# Patient Record
Sex: FEMALE | Race: WHITE | NOT HISPANIC OR LATINO | Employment: FULL TIME
[De-identification: names, ages, dates, MRNs, and addresses within clinical notes are randomized per-mention and may not be internally consistent; named-entity substitution may affect disease eponyms.]

---

## 2017-10-29 ENCOUNTER — HEALTH MAINTENANCE LETTER (OUTPATIENT)
Age: 32
End: 2017-10-29

## 2018-01-09 ENCOUNTER — OFFICE VISIT (OUTPATIENT)
Dept: FAMILY MEDICINE | Facility: CLINIC | Age: 33
End: 2018-01-09
Payer: COMMERCIAL

## 2018-01-09 VITALS
WEIGHT: 166 LBS | BODY MASS INDEX: 25.16 KG/M2 | TEMPERATURE: 98 F | HEIGHT: 68 IN | OXYGEN SATURATION: 95 % | DIASTOLIC BLOOD PRESSURE: 62 MMHG | RESPIRATION RATE: 16 BRPM | HEART RATE: 73 BPM | SYSTOLIC BLOOD PRESSURE: 100 MMHG

## 2018-01-09 DIAGNOSIS — Z00.00 ROUTINE GENERAL MEDICAL EXAMINATION AT A HEALTH CARE FACILITY: Primary | ICD-10-CM

## 2018-01-09 PROCEDURE — 87624 HPV HI-RISK TYP POOLED RSLT: CPT | Performed by: FAMILY MEDICINE

## 2018-01-09 PROCEDURE — 99395 PREV VISIT EST AGE 18-39: CPT | Performed by: FAMILY MEDICINE

## 2018-01-09 PROCEDURE — G0145 SCR C/V CYTO,THINLAYER,RESCR: HCPCS | Performed by: FAMILY MEDICINE

## 2018-01-09 RX ORDER — VILAZODONE HYDROCHLORIDE 40 MG/1
TABLET ORAL
Refills: 3 | COMMUNITY
Start: 2017-10-31 | End: 2018-03-02

## 2018-01-09 NOTE — NURSING NOTE
"Chief Complaint   Patient presents with     Physical       Initial /62  Pulse 73  Temp 98  F (36.7  C) (Tympanic)  Resp 16  Ht 5' 8\" (1.727 m)  Wt 166 lb (75.3 kg)  LMP 11/30/2017 (Approximate)  SpO2 95%  BMI 25.24 kg/m2 Estimated body mass index is 25.24 kg/(m^2) as calculated from the following:    Height as of this encounter: 5' 8\" (1.727 m).    Weight as of this encounter: 166 lb (75.3 kg).  Medication Reconciliation: complete   .Jose Alejandro PADRON      "

## 2018-01-09 NOTE — MR AVS SNAPSHOT
After Visit Summary   1/9/2018    Oanh Interiano    MRN: [id number]           Patient Information     Date Of Birth          1985        Visit Information        Provider Department      1/9/2018 2:20 PM Elif Beard MD [facility]        Today's Diagnoses     Routine general medical examination at a health care facility    -  1      Care Instructions      Preventive Health Recommendations  Female Ages 26 - 39  Yearly exam:   See your health care provider every year in order to    Review health changes.     Discuss preventive care.      Review your medicines if you your doctor has prescribed any.    Until age 30: Get a Pap test every three years (more often if you have had an abnormal result).    After age 30: Talk to your doctor about whether you should have a Pap test every 3 years or have a Pap test with HPV screening every 5 years.   You do not need a Pap test if your uterus was removed (hysterectomy) and you have not had cancer.  You should be tested each year for STDs (sexually transmitted diseases), if you're at risk.   Talk to your provider about how often to have your cholesterol checked.  If you are at risk for diabetes, you should have a diabetes test (fasting glucose).  Shots: Get a flu shot each year. Get a tetanus shot every 10 years.   Nutrition:     Eat at least 5 servings of fruits and vegetables each day.    Eat whole-grain bread, whole-wheat pasta and brown rice instead of white grains and rice.    Talk to your provider about Calcium and Vitamin D.     Lifestyle    Exercise at least 150 minutes a week (30 minutes a day, 5 days of the week). This will help you control your weight and prevent disease.    Limit alcohol to one drink per day.    No smoking.     Wear sunscreen to prevent skin cancer.    See your dentist every six months for an exam and cleaning.            Follow-ups after your visit        Who to contact     If you have  "questions or need follow up information about today's clinic visit or your schedule please contact [facility] directly at [phone].  Normal or non-critical lab and imaging results will be communicated to you by MyChart, letter or phone within 4 business days after the clinic has received the results. If you do not hear from us within 7 days, please contact the clinic through [employer] or phone. If you have a critical or abnormal lab result, we will notify you by phone as soon as possible.  Submit refill requests through [employer] or call your pharmacy and they will forward the refill request to us. Please allow 3 business days for your refill to be completed.          Additional Information About Your Visit        [employer] Information     [employer] gives you secure access to your electronic health record. If you see a primary care provider, you can also send messages to your care team and make appointments. If you have questions, please call your primary care clinic.  If you do not have a primary care provider, please call [phone] and they will assist you.        Care EveryWhere ID     This is your Care EveryWhere ID. This could be used by other organizations to access your [facility] medical records  [id number]        Your Vitals Were     Pulse Temperature Respirations Height Last Period Pulse Oximetry    73 98  F (36.7  C) (Tympanic) 16 5' 8\" (1.727 m) 11/30/2017 (Approximate) 95%    BMI (Body Mass Index)                   25.24 kg/m2            Blood Pressure from Last 3 Encounters:   01/09/18 100/62   07/26/16 120/60   04/13/15 106/66    Weight from Last 3 Encounters:   01/09/18 166 lb (75.3 kg)   07/26/16 164 lb (74.4 kg)   04/13/15 167 lb 1.6 oz (75.8 kg)              We Performed the Following     HPV High Risk Types DNA Cervical     Pap imaged thin layer screen with HPV - recommended age 30 - 65        Primary Care Provider Office Phone # Fax #    Elif Beard MD " [phone]       [address] [facility] [address]        Equal Access to Services     FABIEN IVEY : Hadii aad ku hadgabemarianne Amber, waronaldda luqadaha, qaybta kaalmada angellaorestes, abad darshanain hayaacarito peralestnoy yubayron nimisha mayorga. So [facility] [phone].    ATENCIÓN: Si habla español, tiene a ford disposición servicios gratuitos de asistencia lingüística. Llame al [phone].    We comply with applicable federal civil rights laws and [place] laws. We do not discriminate on the basis of race, color, national origin, age, disability, sex, sexual orientation, or gender identity.            Thank you!     Thank you for choosing [facility]  for your care. Our goal is always to provide you with excellent care. Hearing back from our patients is one way we can continue to improve our services. Please take a few minutes to complete the written survey that you may receive in the mail after your visit with us. Thank you!             Your Updated Medication List - Protect others around you: Learn how to safely use, store and throw away your medicines at www.disposemymeds.org.          This list is accurate as of: 1/9/18  9:51 PM.  Always use your most recent med list.                   Brand Name Dispense Instructions for use Diagnosis    prenatal multivitamin plus iron 27-0.8 MG Tabs per tablet     100 tablet    Take 1 tablet by mouth daily    Possible pregnancy, not confirmed       VIIBRYD 40 MG Tabs tablet   Generic drug:  vilazodone      TK 1 T PO ONCE A DAY AS DIRECTED

## 2018-01-09 NOTE — PROGRESS NOTES
SUBJECTIVE:   CC: Oanh Interiano is an 32 year old woman who presents for preventive health visit.     Healthy Habits:    Do you get at least three servings of calcium containing foods daily (dairy, green leafy vegetables, etc.)? no    Amount of exercise or daily activities, outside of work: 3 day(s) per week    Problems taking medications regularly No    Medication side effects: No    Have you had an eye exam in the past two years? no    Do you see a dentist twice per year? yes    Do you have sleep apnea, excessive snoring or daytime drowsiness?no    Today's PHQ-2 Score:   PHQ-2 ( 1999 Pf[employer]) 7/26/2016 7/16/2013   Q1: Little interest or pleasure in doing things 2 0   Q2: Feeling down, depressed or hopeless 1 0   PHQ-2 Score 3 0         Abuse: Current or Past(Physical, Sexual or Emotional)- No  Do you feel safe in your environment - Yes  Social History   Substance Use Topics     Smoking status: Never Smoker     Smokeless tobacco: Never Used     Alcohol use Yes      Comment: occ     If you drink alcohol do you typically have >3 drinks per day or >7 drinks per week? Yes - AUDIT SCORE:                            Reviewed orders with patient.  Reviewed health maintenance and updated orders accordingly - Yes    Pertinent mammograms are reviewed under the imaging tab.  History of abnormal Pap smear:   NO - age 30-65 PAP every 5 years with negative HPV co-testing recommended  Last 3 Pap and HPV Results:   PAP / HPV 7/16/2014 3/21/2013 4/18/2012   PAP NIL ASC-US(A) NIL     Reviewed and updated as needed this visit by clinical staffTobacco  Allergies  Meds  Med Hx  Surg Hx  Fam Hx  Soc Hx      Reviewed and updated as needed this visit by Provider  Meds          ROS:  C: NEGATIVE for fever, chills, change in weight  I: NEGATIVE for worrisome rashes, moles or lesions  E: NEGATIVE for vision changes or irritation  ENT: NEGATIVE for ear, mouth and throat problems  R: NEGATIVE for significant cough or SOB  B: NEGATIVE  "for masses, tenderness or discharge  CV: NEGATIVE for chest pain, palpitations or peripheral edema  GI: NEGATIVE for nausea, abdominal pain, heartburn, or change in bowel habits  : NEGATIVE for unusual urinary or vaginal symptoms. Periods are regular.  M: NEGATIVE for significant arthralgias or myalgia  N: NEGATIVE for weakness, dizziness or paresthesias  P: NEGATIVE for changes in mood or affect    OBJECTIVE:   /62  Pulse 73  Temp 98  F (36.7  C) (Tympanic)  Resp 16  Ht 5' 8\" (1.727 m)  Wt 166 lb (75.3 kg)  LMP 11/30/2017 (Approximate)  SpO2 95%  BMI 25.24 kg/m2  EXAM:  GENERAL: healthy, alert and no distress  EYES: Eyes grossly normal to inspection, PERRL and conjunctivae and sclerae normal  HENT: ear canals and TM's normal, nose and mouth without ulcers or lesions  NECK: no adenopathy, no asymmetry, masses, or scars and thyroid normal to palpation  RESP: lungs clear to auscultation - no rales, rhonchi or wheezes  BREAST: normal without masses, tenderness or nipple discharge and no palpable axillary masses or adenopathy  CV: regular rate and rhythm, normal S1 S2, no S3 or S4, no murmur, click or rub, no peripheral edema and peripheral pulses strong  ABDOMEN: soft, nontender, no hepatosplenomegaly, no masses and bowel sounds normal   (female): normal female external genitalia, normal urethral meatus, vaginal mucosa pink, moist, well rugated, and normal cervix/adnexa/uterus without masses or discharge  MS: no gross musculoskeletal defects noted, no edema  SKIN: no suspicious lesions or rashes  NEURO: Normal strength and tone, mentation intact and speech normal  PSYCH: mentation appears normal, affect normal/bright    ASSESSMENT/PLAN:   Oanh was seen today for physical.    Diagnoses and all orders for this visit:    Routine general medical examination at a health care facility  -     Pap imaged thin layer screen with HPV - recommended age 30 - 65  -     HPV High Risk Types DNA " "Cervical      Discussed PNV and importance of discussing med plan with psychiatrist before conceiving again - she'll get in touch with psych NP to discuss weaning plan for vibryyd before conception.      COUNSELING:   Reviewed preventive health counseling, as reflected in patient instructions         reports that she has never smoked. She has never used smokeless tobacco.    Estimated body mass index is 25.24 kg/(m^2) as calculated from the following:    Height as of this encounter: 5' 8\" (1.727 m).    Weight as of this encounter: 166 lb (75.3 kg).   Weight management plan: Discussed healthy diet and exercise guidelines and patient will follow up in 12 months in clinic to re-evaluate.    Counseling Resources:  ATP IV Guidelines  Pooled Cohorts Equation Calculator  Breast Cancer Risk Calculator  FRAX Risk Assessment  ICSI Preventive Guidelines  Dietary Guidelines for Americans, 2010  USDA's MyPlate  ASA Prophylaxis  Lung CA Screening    Elif Beard MD  [facility]  "

## 2018-01-12 LAB
COPATH REPORT: NORMAL
PAP: NORMAL

## 2018-01-16 LAB
FINAL DIAGNOSIS: NORMAL
HPV HR 12 DNA CVX QL NAA+PROBE: NEGATIVE
HPV16 DNA SPEC QL NAA+PROBE: NEGATIVE
HPV18 DNA SPEC QL NAA+PROBE: NEGATIVE
SPECIMEN DESCRIPTION: NORMAL
SPECIMEN SOURCE CVX/VAG CYTO: NORMAL

## 2018-01-29 ENCOUNTER — TELEPHONE (OUTPATIENT)
Dept: OBGYN | Facility: CLINIC | Age: 33
End: 2018-01-29

## 2018-01-29 NOTE — TELEPHONE ENCOUNTER
"Pt called c/o bleeding.  LMP 12/28/17.  Bleeding started this morning, \"like a period\".  Discussed rationale for early bleeding.  She will monitor and if bleeding continues, will take HPT in ~1 week.  Adriana Rasheed RN      "

## 2018-03-02 ENCOUNTER — OFFICE VISIT (OUTPATIENT)
Dept: FAMILY MEDICINE | Facility: CLINIC | Age: 33
End: 2018-03-02
Payer: COMMERCIAL

## 2018-03-02 VITALS
SYSTOLIC BLOOD PRESSURE: 91 MMHG | HEART RATE: 99 BPM | DIASTOLIC BLOOD PRESSURE: 61 MMHG | WEIGHT: 166 LBS | BODY MASS INDEX: 25.24 KG/M2 | TEMPERATURE: 98.8 F | OXYGEN SATURATION: 100 %

## 2018-03-02 DIAGNOSIS — N91.2 AMENORRHEA: Primary | ICD-10-CM

## 2018-03-02 DIAGNOSIS — Z32.01 PREGNANCY TEST POSITIVE: ICD-10-CM

## 2018-03-02 DIAGNOSIS — F32.1 MODERATE MAJOR DEPRESSION (H): ICD-10-CM

## 2018-03-02 LAB — BETA HCG QUAL IFA URINE: POSITIVE

## 2018-03-02 PROCEDURE — 84703 CHORIONIC GONADOTROPIN ASSAY: CPT | Performed by: NURSE PRACTITIONER

## 2018-03-02 PROCEDURE — 99213 OFFICE O/P EST LOW 20 MIN: CPT | Performed by: NURSE PRACTITIONER

## 2018-03-02 ASSESSMENT — ANXIETY QUESTIONNAIRES
GAD7 TOTAL SCORE: 8
2. NOT BEING ABLE TO STOP OR CONTROL WORRYING: SEVERAL DAYS
6. BECOMING EASILY ANNOYED OR IRRITABLE: MORE THAN HALF THE DAYS
7. FEELING AFRAID AS IF SOMETHING AWFUL MIGHT HAPPEN: SEVERAL DAYS
3. WORRYING TOO MUCH ABOUT DIFFERENT THINGS: MORE THAN HALF THE DAYS
1. FEELING NERVOUS, ANXIOUS, OR ON EDGE: SEVERAL DAYS
IF YOU CHECKED OFF ANY PROBLEMS ON THIS QUESTIONNAIRE, HOW DIFFICULT HAVE THESE PROBLEMS MADE IT FOR YOU TO DO YOUR WORK, TAKE CARE OF THINGS AT HOME, OR GET ALONG WITH OTHER PEOPLE: SOMEWHAT DIFFICULT
5. BEING SO RESTLESS THAT IT IS HARD TO SIT STILL: SEVERAL DAYS

## 2018-03-02 ASSESSMENT — PAIN SCALES - GENERAL: PAINLEVEL: NO PAIN (0)

## 2018-03-02 ASSESSMENT — PATIENT HEALTH QUESTIONNAIRE - PHQ9: 5. POOR APPETITE OR OVEREATING: NOT AT ALL

## 2018-03-02 NOTE — MR AVS SNAPSHOT
After Visit Summary   3/2/2018    Oanh Interiano    MRN: [id number]           Patient Information     Date Of Birth          1985        Visit Information        Provider Department      3/2/2018 2:00 PM Vannesa Tarango APRN CNP [facility]        Today's Diagnoses     Amenorrhea    -  1    Moderate major depression (H)        Pregnancy test positive           Follow-ups after your visit        Additional Services     OB/GYN REFERRAL       Your provider has referred you to:  [facility]: [facility] [phone]   [email or web address]    Please be aware that coverage of these services is subject to the terms and limitations of your health insurance plan.  Call member services at your health plan with any benefit or coverage questions.      Please bring the following with you to your appointment:    (1) Any X-Rays, CTs or MRIs which have been performed.  Contact the facility where they were done to arrange for  prior to your scheduled appointment.   (2) List of current medications   (3) This referral request   (4) Any documents/labs given to you for this referral                  Who to contact     If you have questions or need follow up information about today's clinic visit or your schedule please contact [facility] directly at [phone].  Normal or non-critical lab and imaging results will be communicated to you by MyChart, letter or phone within 4 business days after the clinic has received the results. If you do not hear from us within 7 days, please contact the clinic through MyChart or phone. If you have a critical or abnormal lab result, we will notify you by phone as soon as possible.  Submit refill requests through [employer] or call your pharmacy and they will forward the refill request to us. Please allow 3 business days for your refill to be completed.          Additional Information  About Your Visit        [employer] Information     [employer] gives you secure access to your electronic health record. If you see a primary care provider, you can also send messages to your care team and make appointments. If you have questions, please call your primary care clinic.  If you do not have a primary care provider, please call [phone] and they will assist you.        Care EveryWhere ID     This is your Care EveryWhere ID. This could be used by other organizations to access your [facility] medical records  [id number]        Your Vitals Were     Pulse Temperature Last Period Pulse Oximetry Breastfeeding? BMI (Body Mass Index)    99 98.8  F (37.1  C) (Oral) 01/29/2018 (LMP Unknown) 100% No 25.24 kg/m2       Blood Pressure from Last 3 Encounters:   03/02/18 91/61   01/09/18 100/62   07/26/16 120/60    Weight from Last 3 Encounters:   03/02/18 166 lb (75.3 kg)   01/09/18 166 lb (75.3 kg)   07/26/16 164 lb (74.4 kg)              We Performed the Following     Beta HCG qual [facility] urine - [facility] and [place]     OB/GYN REFERRAL          Today's Medication Changes          These changes are accurate as of 3/2/18  2:59 PM.  If you have any questions, ask your nurse or doctor.               Start taking these medicines.        Dose/Directions    sertraline 50 MG tablet   Commonly known as:  ZOLOFT   Used for:  Moderate major depression (H)   Started by:  Vannesa Tarango APRN CNP        Take 1/2 tablet (25 mg) for 1-2 weeks, then increase to 1 tablet orally daily   Quantity:  30 tablet   Refills:  1            Where to get your medicines      These medications were sent to [facility] - [place], [place] - [address]  [address], [facility] [address]     Phone:  [phone]     sertraline 50 MG tablet                Primary Care Provider Office Phone # Fax #    Elif Beard -[phone] [address] [facility] [address]         Equal Access to Services     [facility] HospitalGE[facility]GI : Hadii aad ku hadgabemarianne Tinalise, waronaldda luqadaha, qaybta mitulmarioabad santana. So [facility] [phone].    ATENCIÓN: Si habla español, tiene a ford disposición servicios gratuitos de asistencia lingüística. Llame al [phone].    We comply with applicable federal civil rights laws and [place] laws. We do not discriminate on the basis of race, color, national origin, age, disability, sex, sexual orientation, or gender identity.            Thank you!     Thank you for choosing [facility]  for your care. Our goal is always to provide you with excellent care. Hearing back from our patients is one way we can continue to improve our services. Please take a few minutes to complete the written survey that you may receive in the mail after your visit with us. Thank you!             Your Updated Medication List - Protect others around you: Learn how to safely use, store and throw away your medicines at www.disposemymeds.org.          This list is accurate as of 3/2/18  2:59 PM.  Always use your most recent med list.                   Brand Name Dispense Instructions for use Diagnosis    prenatal multivitamin plus iron 27-0.8 MG Tabs per tablet     100 tablet    Take 1 tablet by mouth daily    Possible pregnancy, not confirmed       sertraline 50 MG tablet    ZOLOFT    30 tablet    Take 1/2 tablet (25 mg) for 1-2 weeks, then increase to 1 tablet orally daily    Moderate major depression (H)

## 2018-03-02 NOTE — PROGRESS NOTES
SUBJECTIVE:   Oanh Interiano is a 32 year old female who presents to clinic today for the following health issues:      Patient is here today to confirm pregnancy, sis a home test last Mon.and Tues and both were pos.    LMP 1/29/18  Upset stomach and diarrhea past 2 days  Symptoms better today  No fevers  No loose stools today  No spotting  Mild nausea and breast tenderness  Denies alcohol, smoking, drug use  She is taking a prenatal    She has a 2 year old daughter  She miscarried at 5w last month  [facility] and was not happy with the experience there so she is looking to deliver somewhere else    She has a history of MDD  She was seeing a therapist  Now seeing a couples therapist with her   She was seeing a psychiatrist.Couldn't get back in to see her for 1 1/2 months  She was on Vibryd 20 mg  She stopped taking 4 days ago d/t concern for side effects in pregnancy and its a newer medication without research  Wants to restart sertraline  Has noticed some increased sensitivity, overly emotional  She experienced sexual side effects, fatigue with sertraline but otherwise tolerated and found it effective for treating depression  Has also tried Paxil, Prozac, Buspar, Celexa which did not work well for her  Denies suicidal ideation    Problem list and histories reviewed & adjusted, as indicated.  Additional history: none    Patient Active Problem List   Diagnosis     Generalized anxiety disorder     CARDIOVASCULAR SCREENING; LDL GOAL LESS THAN 160     Fear of public speaking     Moderate major depression (H)     Postpartum anxiety     Past Surgical History:   Procedure Laterality Date     NO HISTORY OF SURGERY         Social History   Substance Use Topics     Smoking status: Never Smoker     Smokeless tobacco: Never Used     Alcohol use Yes      Comment: occ     Family History   Problem Relation Age of Onset     Thyroid Disease Mother      hypothyroidism     Circulatory Mother       Depression Mother      Neurologic Disorder Mother      Psychotic Disorder Mother      Thyroid Disease Sister      goiter     Hypertension Father      Lipids Father      DIABETES Paternal Grandmother      CEREBROVASCULAR DISEASE Paternal Grandmother      Alcohol/Drug Paternal Grandmother      CEREBROVASCULAR DISEASE Paternal Grandfather      Alcohol/Drug Paternal Grandfather      Circulatory Paternal Grandfather      Eye Disorder Paternal Grandfather      Cancer - colorectal Maternal Grandfather      Allergies Sister      Alzheimer Disease Maternal Grandmother      Depression Sister      Genitourinary Problems Sister      Psychotic Disorder Sister      Asthma Sister            Reviewed and updated as needed this visit by clinical staff  Tobacco  Allergies  Meds  Med Hx  Surg Hx  Fam Hx  Soc Hx      Reviewed and updated as needed this visit by Provider         ROS:  Constitutional, HEENT, cardiovascular, pulmonary, gi and gu systems are negative, except as otherwise noted.    OBJECTIVE:     BP 91/61 (BP Location: Left arm, Patient Position: Chair, Cuff Size: Adult Regular)  Pulse 99  Temp 98.8  F (37.1  C) (Oral)  Wt 166 lb (75.3 kg)  LMP 01/29/2018 (LMP Unknown)  SpO2 100%  Breastfeeding? No  BMI 25.24 kg/m2  Body mass index is 25.24 kg/(m^2).  GENERAL: healthy, alert and no distress  RESP: lungs clear to auscultation - no rales, rhonchi or wheezes  CV: regular rate and rhythm, normal S1 S2, no S3 or S4, no murmur, click or rub, no peripheral edema and peripheral pulses strong  ABDOMEN: soft, nontender, no hepatosplenomegaly, no masses and bowel sounds normal    Diagnostic Test Results:  Results for orders placed or performed in visit on 03/02/18   Beta HCG qual [facility] urine - [facility] and [place]   Result Value Ref Range    Beta HCG Qual IFA Urine Positive (A) NEG^Negative          ASSESSMENT/PLAN:       ICD-10-CM    1. Amenorrhea N91.2 Beta HCG qual [facility] urine - [facility] and [place]   2. Moderate major  depression (H) F32.1 sertraline (ZOLOFT) 50 MG tablet   3. Pregnancy test positive Z32.01 OB/GYN REFERRAL     Recommended looking into delivering at [facility] and discussed [facility] who also see patients at our clinic  Discussed risks and benefits of SSRIs in pregnancy, also discussed risks and untreated depression in pregnancy. Recommend restarting SSRI. She tolerated Zoloft and I think its reasonable to restart. Will start at low dose. Advised may take 4-8 weeks to achieve therapeutic effect, though I expect less time for her as she was previously on Vibryd. Advised to follow up in 1 month    AGUSTINA [facility]

## 2018-03-03 ASSESSMENT — PATIENT HEALTH QUESTIONNAIRE - PHQ9: SUM OF ALL RESPONSES TO PHQ QUESTIONS 1-9: 8

## 2018-03-03 ASSESSMENT — ANXIETY QUESTIONNAIRES: GAD7 TOTAL SCORE: 8

## 2019-01-22 ENCOUNTER — OFFICE VISIT (OUTPATIENT)
Dept: DERMATOLOGY | Facility: CLINIC | Age: 34
End: 2019-01-22
Payer: COMMERCIAL

## 2019-01-22 VITALS — OXYGEN SATURATION: 96 % | HEART RATE: 83 BPM | SYSTOLIC BLOOD PRESSURE: 103 MMHG | DIASTOLIC BLOOD PRESSURE: 66 MMHG

## 2019-01-22 DIAGNOSIS — L82.1 SEBORRHEIC KERATOSIS: ICD-10-CM

## 2019-01-22 DIAGNOSIS — D22.9 NEVUS: ICD-10-CM

## 2019-01-22 DIAGNOSIS — L81.4 LENTIGO: ICD-10-CM

## 2019-01-22 DIAGNOSIS — D18.00 ANGIOMA: Primary | ICD-10-CM

## 2019-01-22 PROCEDURE — 99214 OFFICE O/P EST MOD 30 MIN: CPT | Performed by: PHYSICIAN ASSISTANT

## 2019-01-22 NOTE — PROGRESS NOTES
HPI:   Oanh Interiano is a 33 year old female who presents for Full skin cancer screening.  chief complaint  Last Skin Exam: couple years ago       1st Baseline: YES  Personal HX of Skin Cancer: none   Personal HX of Malignant Melanoma: none   Family HX of Skin Cancer / Malignant Melanoma: mother, NMSC  Personal HX of Atypical Moles: none  Risk factors: sun exposure,  in her youth, tanning bed use in her youth  New / Changing lesions: yes, spot on back  Social History: here with her 2 month old daughter, has a toddler daughter at home. She lives in [facility]  On review of systems, there are no further skin complaints, patient is feeling otherwise well.  See patient intake sheet.  ROS of the following were done and are negative: Constitutional, Eyes, Ears, Nose,   Mouth, Throat, Cardiovascular, Respiratory, GI, Genitourinary, Musculoskeletal,   Psychiatric, Endocrine, Allergic/Immunologic.    This document serves as a record of the services and decisions personally performed and made by Bria Deluna, MS, PA-C. It was created on her behalf by Lilia Anguiano, a trained medical scribe. The creation of this document is based on the provider's statements to the medical scribe.  Lilia Anguiano 12:26 PM January 22, 2019    PHYSICAL EXAM:   /66   Pulse 83   SpO2 96%   Breastfeeding? Yes   Skin exam performed as follows: Type 2 skin. Mood appropriate  Alert and Oriented X 3. Well developed, well nourished in no distress.  General appearance: Normal  Head including face: Normal  Eyes: conjunctiva and lids: Normal  Mouth: Lips, teeth, gums: Normal  Neck: Normal  Chest-breast/axillae: Normal  Back: Normal  Spleen and liver: Normal  Cardiovascular: Exam of peripheral vascular system by observation for swelling, varicosities, edema: Normal  Genitalia: groin, buttocks: Normal  Extremities: digits/nails (clubbing): Normal  Eccrine and Apocrine glands: Normal  Right upper  extremity: Normal  Left upper extremity: Normal  Right lower extremity: Normal  Left lower extremity: Normal  Skin: Scalp and body hair: See below    Pt deferred exam of breasts, groin, buttocks: No    Other physical findings:  1. Multiple pigmented macules on extremities and trunk  2. Multiple pigmented macules on face, trunk and extremities  3. Multiple vascular papules on trunk, arms and legs  4. Multiple scattered keratotic plaques       Except as noted above, no other signs of skin cancer or melanoma.     ASSESSMENT/PLAN:   Benign Full skin cancer screening today.      Patient with history of none  Advised on monthly self exams and 1 year  Patient Education: Appropriate brochures given.    Multiple benign appearing nevi on arms, legs and trunk. Discussed ABCDEs of melanoma and sunscreen.   Multiple lentigos on arms, legs and trunk. Advised benign, no treatment needed.  Multiple scattered angiomas. Advised benign, no treatment needed.   Seborrheic keratosis on arms, legs and trunk. Advised benign, no treatment needed.  Several dermal nevi - discussed removal in the future if bothersome  Dermatofibroma on the right leg x 2. Advised benign no treatment needed.         Follow-up: yearly FSE/PRN sooner    1.) Patient was asked about new and changing moles. YES  2.) Patient received a complete physical skin examination: YES  3.) Patient was counseled to perform a monthly self skin examination: YES  Scribed By: Lilia Anguiano, Medical Scribe    The information in this document, created by the medical scribe for me, accurately reflects the services I personally performed and the decisions made by me. I have reviewed and approved this document for accuracy prior to leaving the patient care area.  January 22, 2019 12:34 PM    Bria Deluna MS, PA-C

## 2019-01-22 NOTE — LETTER
1/22/2019         RE: Oanh Interiano  [address] [place] [address]        Dear Colleague,    Thank you for referring your patient, Oanh Interiano, to the [facility]. Please see a copy of my visit note below.    HPI:   Oanh Interiano is a 33 year old female who presents for Full skin cancer screening.  chief complaint  Last Skin Exam: couple years ago       1st Baseline: YES  Personal HX of Skin Cancer: none   Personal HX of Malignant Melanoma: none   Family HX of Skin Cancer / Malignant Melanoma: mother, NMSC  Personal HX of Atypical Moles: none  Risk factors: sun exposure,  in her youth, tanning bed use in her youth  New / Changing lesions: yes, spot on back  Social History: here with her 2 month old daughter, has a toddler daughter at home. She lives in [facility]  On review of systems, there are no further skin complaints, patient is feeling otherwise well.  See patient intake sheet.  ROS of the following were done and are negative: Constitutional, Eyes, Ears, Nose,   Mouth, Throat, Cardiovascular, Respiratory, GI, Genitourinary, Musculoskeletal,   Psychiatric, Endocrine, Allergic/Immunologic.    This document serves as a record of the services and decisions personally performed and made by Bria Deluna, MS, PA-C. It was created on her behalf by Lilia Anguiano, a trained medical scribe. The creation of this document is based on the provider's statements to the medical scribe.  Lilia Anguiano 12:26 PM January 22, 2019    PHYSICAL EXAM:   /66   Pulse 83   SpO2 96%   Breastfeeding? Yes   Skin exam performed as follows: Type 2 skin. Mood appropriate  Alert and Oriented X 3. Well developed, well nourished in no distress.  General appearance: Normal  Head including face: Normal  Eyes: conjunctiva and lids: Normal  Mouth: Lips, teeth, gums: Normal  Neck: Normal  Chest-breast/axillae: Normal  Back: Normal  Spleen and liver:  Normal  Cardiovascular: Exam of peripheral vascular system by observation for swelling, varicosities, edema: Normal  Genitalia: groin, buttocks: Normal  Extremities: digits/nails (clubbing): Normal  Eccrine and Apocrine glands: Normal  Right upper extremity: Normal  Left upper extremity: Normal  Right lower extremity: Normal  Left lower extremity: Normal  Skin: Scalp and body hair: See below    Pt deferred exam of breasts, groin, buttocks: No    Other physical findings:  1. Multiple pigmented macules on extremities and trunk  2. Multiple pigmented macules on face, trunk and extremities  3. Multiple vascular papules on trunk, arms and legs  4. Multiple scattered keratotic plaques       Except as noted above, no other signs of skin cancer or melanoma.     ASSESSMENT/PLAN:   Benign Full skin cancer screening today.      Patient with history of none  Advised on monthly self exams and 1 year  Patient Education: Appropriate brochures given.    Multiple benign appearing nevi on arms, legs and trunk. Discussed ABCDEs of melanoma and sunscreen.   Multiple lentigos on arms, legs and trunk. Advised benign, no treatment needed.  Multiple scattered angiomas. Advised benign, no treatment needed.   Seborrheic keratosis on arms, legs and trunk. Advised benign, no treatment needed.  Several dermal nevi - discussed removal in the future if bothersome  Dermatofibroma on the right leg x 2. Advised benign no treatment needed.         Follow-up: yearly FSE/PRN sooner    1.) Patient was asked about new and changing moles. YES  2.) Patient received a complete physical skin examination: YES  3.) Patient was counseled to perform a monthly self skin examination: YES  Scribed By: Lilia Anguiano, Medical Scribe    The information in this document, created by the medical scribe for me, accurately reflects the services I personally performed and the decisions made by me. I have reviewed and approved this document for accuracy prior to  leaving the patient care area.  January 22, 2019 12:34 PM    Bria Deluna, MS, PAVIDAL      Again, thank you for allowing me to participate in the care of your patient.        Sincerely,        Bria Deluna PA-C

## 2019-12-03 ENCOUNTER — OFFICE VISIT (OUTPATIENT)
Dept: PODIATRY | Facility: CLINIC | Age: 34
End: 2019-12-03
Payer: COMMERCIAL

## 2019-12-03 ENCOUNTER — ANCILLARY PROCEDURE (OUTPATIENT)
Dept: GENERAL RADIOLOGY | Facility: CLINIC | Age: 34
End: 2019-12-03
Attending: PODIATRIST
Payer: COMMERCIAL

## 2019-12-03 VITALS
SYSTOLIC BLOOD PRESSURE: 108 MMHG | WEIGHT: 178 LBS | BODY MASS INDEX: 27.06 KG/M2 | DIASTOLIC BLOOD PRESSURE: 61 MMHG | HEART RATE: 70 BPM

## 2019-12-03 DIAGNOSIS — M77.8 CAPSULITIS OF FOOT, LEFT: Primary | ICD-10-CM

## 2019-12-03 DIAGNOSIS — M77.8 CAPSULITIS OF FOOT, LEFT: ICD-10-CM

## 2019-12-03 PROCEDURE — 73630 X-RAY EXAM OF FOOT: CPT | Mod: LT

## 2019-12-03 PROCEDURE — 99203 OFFICE O/P NEW LOW 30 MIN: CPT | Performed by: PODIATRIST

## 2019-12-03 NOTE — LETTER
12/3/2019         RE: Oanh Interiano  [address] [place] [address]        Dear Colleague,    Thank you for referring your patient, Oanh Interiano, to the [facility]. Please see a copy of my visit note below.    S:  Patient complains of left foot pain.  Points to plantar second metatarsal head.  Describes as a burning pain.  aggravated by activity and relieved by rest.  Has had this off and on most of her life but for last several months been worse.  Ran half marathon this fall.  No pain anywhere else on feet.  Goes barefoot around house.  Works at sitting job.  Denies erythema, edema, weakness, numbness.  Denies arthralgias anywhere else.      ROS:  A 10-point review of systems was performed and is positive for that noted in the HPI and as seen above.  All other areas are negative.        Allergies   Allergen Reactions     Milk Protein Extract Nausea and Vomiting, Hives, Itching, Swelling and GI Disturbance     Itching, hives, swelling, GI distrubance     Onion GI Disturbance     Sulfa Drugs Hives       Current Outpatient Medications   Medication Sig Dispense Refill     sertraline (ZOLOFT) 50 MG tablet Take 1/2 tablet (25 mg) for 1-2 weeks, then increase to 1 tablet orally daily 30 tablet 1       Patient Active Problem List   Diagnosis     Generalized anxiety disorder     CARDIOVASCULAR SCREENING; LDL GOAL LESS THAN 160     Moderate major depression (H)     Vacuum extraction, delivered, current hospitalization     Prolonged second stage of labor, delivered     Prolonged labor with baby delivered     Postpartum atony of uterus with hemorrhage, delivered       Past Medical History:   Diagnosis Date     ASCUS with positive high risk HPV 3/2013    + HPV 59, 62     Papanicolaou smear of cervix with low grade squamous intraepithelial lesion (LGSIL) 9/2010    colp WNL       Past Surgical History:   Procedure Laterality Date     NO HISTORY OF SURGERY         Family History    Problem Relation Age of Onset     Thyroid Disease Mother         hypothyroidism     Circulatory Mother      Depression Mother      Neurologic Disorder Mother      Psychotic Disorder Mother      Skin Cancer Mother      Thyroid Disease Sister         goiter     Hypertension Father      Lipids Father      Diabetes Paternal Grandmother      Cerebrovascular Disease Paternal Grandmother      Alcohol/Drug Paternal Grandmother      Cerebrovascular Disease Paternal Grandfather      Alcohol/Drug Paternal Grandfather      Circulatory Paternal Grandfather      Eye Disorder Paternal Grandfather      Cancer - colorectal Maternal Grandfather      Allergies Sister      Alzheimer Disease Maternal Grandmother      Depression Sister      Genitourinary Problems Sister      Psychotic Disorder Sister      Asthma Sister        Social History     Tobacco Use     Smoking status: Never Smoker     Smokeless tobacco: Never Used   Substance Use Topics     Alcohol use: Yes     Comment: occ         O:   /61 (BP Location: Right arm)   Pulse 70   Wt 80.7 kg (178 lb)   BMI 27.06 kg/m   .      Constitutional/ general:  Pt is in no apparent distress, appears well-nourished.  Cooperative with history and physical exam.     Psych:  The patient answered questions appropriately.  Normal affect.  Seems to have reasonable expectations, in terms of treatment.     Eyes:  Visual scanning/ tracking without deficit.     Ears:  Response to auditory stimuli is normal.  No  hearing aid devices.  Auricles in proper alignment.     Lymphatic:  Popliteal lymph nodes not enlarged.     Lungs:  Non labored breathing, non labored speech. No cough.  No audible wheezing. Even, quiet breathing.       Vascular:  Pedal pulses are palpable bilaterally for both the DP and PT arteries.  CFT < 3 sec.  No edema.  Pedal hair growth noted.     Neuro:  Alert and oriented x 3. Coordinated gait.  Light touch sensation is intact to the L4, L5, S1 distributions. No obvious  deficits.  No evidence of neurological-based weakness, spasticity, or contracture in the lower extremities.     Derm: Normal texture and turgor.  No erythema, ecchymosis, or cyanosis.  No open lesions.     Musculoskeletal:    Lower extremity muscle strength is normal.  Patient is ambulatory without an assistive device or brace.  Pronated arch with weightbearing left>R.  No forefoot or rear foot deformities noted.  MS 5/5 all compartments.  Normal ROM all fore foot and rearfoot joints.  No equinus.  Pain under left second metatarsal head plantar.  Negative Lachmans test.  Negative Mulders click.  No pain anywhere else.  No erythema, edema, ecchymosis, or subcutaneous masses noted.      Radiographic Exam:   Long second metatarsal, but no other bone abnormalities.     A:  Subsecond capsulitis left foot     P:  X rays taken of left foot today.  Discussed cause with patient.  Ice bid.  Instructed to wear stiff soles shoes at all times and I made suggestions.  Dispensed metatarsal pad.  Avoid activities that bother this and discussed which activities will aggravate.  Will try arch supports.  Will call if she would like orthotics.  RETURN TO CLINIC PRN.    Abhijeet Read DPM DPM, FACFAS      Again, thank you for allowing me to participate in the care of your patient.        Sincerely,        Abhijeet Read DPM

## 2019-12-03 NOTE — PROGRESS NOTES
S:  Patient complains of left foot pain.  Points to plantar second metatarsal head.  Describes as a burning pain.  aggravated by activity and relieved by rest.  Has had this off and on most of her life but for last several months been worse.  Ran half marathon this fall.  No pain anywhere else on feet.  Goes barefoot around house.  Works at sitting job.  Denies erythema, edema, weakness, numbness.  Denies arthralgias anywhere else.      ROS:  A 10-point review of systems was performed and is positive for that noted in the HPI and as seen above.  All other areas are negative.        Allergies   Allergen Reactions     Milk Protein Extract Nausea and Vomiting, Hives, Itching, Swelling and GI Disturbance     Itching, hives, swelling, GI distrubance     Onion GI Disturbance     Sulfa Drugs Hives       Current Outpatient Medications   Medication Sig Dispense Refill     sertraline (ZOLOFT) 50 MG tablet Take 1/2 tablet (25 mg) for 1-2 weeks, then increase to 1 tablet orally daily 30 tablet 1       Patient Active Problem List   Diagnosis     Generalized anxiety disorder     CARDIOVASCULAR SCREENING; LDL GOAL LESS THAN 160     Moderate major depression (H)     Vacuum extraction, delivered, current hospitalization     Prolonged second stage of labor, delivered     Prolonged labor with baby delivered     Postpartum atony of uterus with hemorrhage, delivered       Past Medical History:   Diagnosis Date     ASCUS with positive high risk HPV 3/2013    + HPV 59, 62     Papanicolaou smear of cervix with low grade squamous intraepithelial lesion (LGSIL) 9/2010    colp WNL       Past Surgical History:   Procedure Laterality Date     NO HISTORY OF SURGERY         Family History   Problem Relation Age of Onset     Thyroid Disease Mother         hypothyroidism     Circulatory Mother      Depression Mother      Neurologic Disorder Mother      Psychotic Disorder Mother      Skin Cancer Mother      Thyroid Disease Sister         goiter      Hypertension Father      Lipids Father      Diabetes Paternal Grandmother      Cerebrovascular Disease Paternal Grandmother      Alcohol/Drug Paternal Grandmother      Cerebrovascular Disease Paternal Grandfather      Alcohol/Drug Paternal Grandfather      Circulatory Paternal Grandfather      Eye Disorder Paternal Grandfather      Cancer - colorectal Maternal Grandfather      Allergies Sister      Alzheimer Disease Maternal Grandmother      Depression Sister      Genitourinary Problems Sister      Psychotic Disorder Sister      Asthma Sister        Social History     Tobacco Use     Smoking status: Never Smoker     Smokeless tobacco: Never Used   Substance Use Topics     Alcohol use: Yes     Comment: occ         O:   /61 (BP Location: Right arm)   Pulse 70   Wt 80.7 kg (178 lb)   BMI 27.06 kg/m  .      Constitutional/ general:  Pt is in no apparent distress, appears well-nourished.  Cooperative with history and physical exam.     Psych:  The patient answered questions appropriately.  Normal affect.  Seems to have reasonable expectations, in terms of treatment.     Eyes:  Visual scanning/ tracking without deficit.     Ears:  Response to auditory stimuli is normal.  No  hearing aid devices.  Auricles in proper alignment.     Lymphatic:  Popliteal lymph nodes not enlarged.     Lungs:  Non labored breathing, non labored speech. No cough.  No audible wheezing. Even, quiet breathing.       Vascular:  Pedal pulses are palpable bilaterally for both the DP and PT arteries.  CFT < 3 sec.  No edema.  Pedal hair growth noted.     Neuro:  Alert and oriented x 3. Coordinated gait.  Light touch sensation is intact to the L4, L5, S1 distributions. No obvious deficits.  No evidence of neurological-based weakness, spasticity, or contracture in the lower extremities.     Derm: Normal texture and turgor.  No erythema, ecchymosis, or cyanosis.  No open lesions.     Musculoskeletal:    Lower extremity muscle strength is normal.   Patient is ambulatory without an assistive device or brace.  Pronated arch with weightbearing left>R.  No forefoot or rear foot deformities noted.  MS 5/5 all compartments.  Normal ROM all fore foot and rearfoot joints.  No equinus.  Pain under left second metatarsal head plantar.  Negative Lachmans test.  Negative Mulders click.  No pain anywhere else.  No erythema, edema, ecchymosis, or subcutaneous masses noted.      Radiographic Exam:   Long second metatarsal, but no other bone abnormalities.     A:  Subsecond capsulitis left foot     P:  X rays taken of left foot today.  Discussed cause with patient.  Ice bid.  Instructed to wear stiff soles shoes at all times and I made suggestions.  Dispensed metatarsal pad.  Avoid activities that bother this and discussed which activities will aggravate.  Will try arch supports.  Will call if she would like orthotics.  RETURN TO CLINIC PRN.    Abhijeet Read DPM DPM, FACFAS

## 2019-12-03 NOTE — PATIENT INSTRUCTIONS
Weight management plan: Patient was referred to their PCP to discuss a diet and exercise plan.  We wish you continued good healing. If you have any questions or concerns, please do not hesitate to contact us at [phone]    Please remember to call and schedule a follow up appointment if one was recommended at your earliest convenience.   PODIATRY CLINIC HOURS  TELEPHONE NUMBER    Dr. Abhijeet Read D.P.M [facility]    Clinics:  [facility]    [facility]ain[facility]   Tuesday 1PM-6PM  [place]  Wednesday 7AM-2PM  [facility]  Thursday 10AM-6PM  [place]  Friday 7AM-3PM  [place]  Specialty schedulers:   [phone] to make an appointment with any Specialty Provider.        Urgent Care locations:    [facility] Monday-Friday 5 pm - 9 pm. Saturday-Sunday 9 am -5pm    Monday-Friday 11 am - 9 pm Saturday 9 am - 5 pm     Monday-Sunday 12 noon-8PM [phone]     If you need a medication refill, please contact us you may need lab work and/or a follow up visit prior to your refill (i.e. Antifungal medications).    [facility] (secure e-mail communication and access to your chart) to send a message or to make an appointment.    If MRI needed please call [facility] at [phone]

## 2020-03-01 ENCOUNTER — HEALTH MAINTENANCE LETTER (OUTPATIENT)
Age: 35
End: 2020-03-01

## 2020-03-05 ENCOUNTER — TRANSCRIBE ORDERS (OUTPATIENT)
Dept: MATERNAL FETAL MEDICINE | Facility: CLINIC | Age: 35
End: 2020-03-05

## 2020-03-05 ENCOUNTER — PRENATAL OFFICE VISIT (OUTPATIENT)
Dept: NURSING | Facility: CLINIC | Age: 35
End: 2020-03-05
Payer: COMMERCIAL

## 2020-03-05 VITALS
BODY MASS INDEX: 26.22 KG/M2 | HEIGHT: 69 IN | WEIGHT: 177 LBS | TEMPERATURE: 98 F | SYSTOLIC BLOOD PRESSURE: 102 MMHG | DIASTOLIC BLOOD PRESSURE: 68 MMHG | HEART RATE: 82 BPM

## 2020-03-05 DIAGNOSIS — Z23 NEED FOR TDAP VACCINATION: ICD-10-CM

## 2020-03-05 DIAGNOSIS — O26.90 PREGNANCY RELATED CONDITION, ANTEPARTUM: Primary | ICD-10-CM

## 2020-03-05 DIAGNOSIS — O09.529 AMA (ADVANCED MATERNAL AGE) MULTIGRAVIDA 35+: Primary | ICD-10-CM

## 2020-03-05 LAB
ABO + RH BLD: NORMAL
ABO + RH BLD: NORMAL
ALBUMIN UR-MCNC: NEGATIVE MG/DL
APPEARANCE UR: CLEAR
BILIRUB UR QL STRIP: NEGATIVE
BLD GP AB SCN SERPL QL: NORMAL
BLOOD BANK CMNT PATIENT-IMP: NORMAL
COLOR UR AUTO: YELLOW
ERYTHROCYTE [DISTWIDTH] IN BLOOD BY AUTOMATED COUNT: 13.4 % (ref 10–15)
GLUCOSE UR STRIP-MCNC: NEGATIVE MG/DL
HBV SURFACE AG SERPL QL IA: NONREACTIVE
HCT VFR BLD AUTO: 40.5 % (ref 35–47)
HGB BLD-MCNC: 13.4 G/DL (ref 11.7–15.7)
HGB UR QL STRIP: NEGATIVE
HIV 1+2 AB+HIV1 P24 AG SERPL QL IA: NONREACTIVE
KETONES UR STRIP-MCNC: NEGATIVE MG/DL
LEUKOCYTE ESTERASE UR QL STRIP: NEGATIVE
MCH RBC QN AUTO: 31.4 PG (ref 26.5–33)
MCHC RBC AUTO-ENTMCNC: 33.1 G/DL (ref 31.5–36.5)
MCV RBC AUTO: 95 FL (ref 78–100)
NITRATE UR QL: NEGATIVE
PH UR STRIP: 7.5 PH (ref 5–7)
PLATELET # BLD AUTO: 282 10E9/L (ref 150–450)
RBC # BLD AUTO: 4.27 10E12/L (ref 3.8–5.2)
SOURCE: ABNORMAL
SP GR UR STRIP: 1.01 (ref 1–1.03)
SPECIMEN EXP DATE BLD: NORMAL
TSH SERPL DL<=0.005 MIU/L-ACNC: 1.76 MU/L (ref 0.4–4)
UROBILINOGEN UR STRIP-ACNC: 0.2 EU/DL (ref 0.2–1)
WBC # BLD AUTO: 5.6 10E9/L (ref 4–11)

## 2020-03-05 PROCEDURE — 87086 URINE CULTURE/COLONY COUNT: CPT | Performed by: OBSTETRICS & GYNECOLOGY

## 2020-03-05 PROCEDURE — 86900 BLOOD TYPING SEROLOGIC ABO: CPT | Performed by: OBSTETRICS & GYNECOLOGY

## 2020-03-05 PROCEDURE — 36415 COLL VENOUS BLD VENIPUNCTURE: CPT | Performed by: OBSTETRICS & GYNECOLOGY

## 2020-03-05 PROCEDURE — 81003 URINALYSIS AUTO W/O SCOPE: CPT | Performed by: OBSTETRICS & GYNECOLOGY

## 2020-03-05 PROCEDURE — 85027 COMPLETE CBC AUTOMATED: CPT | Performed by: OBSTETRICS & GYNECOLOGY

## 2020-03-05 PROCEDURE — 84443 ASSAY THYROID STIM HORMONE: CPT | Performed by: OBSTETRICS & GYNECOLOGY

## 2020-03-05 PROCEDURE — 87389 HIV-1 AG W/HIV-1&-2 AB AG IA: CPT | Performed by: OBSTETRICS & GYNECOLOGY

## 2020-03-05 PROCEDURE — 86850 RBC ANTIBODY SCREEN: CPT | Performed by: OBSTETRICS & GYNECOLOGY

## 2020-03-05 PROCEDURE — 99207 ZZC NO CHARGE NURSE ONLY: CPT

## 2020-03-05 PROCEDURE — 86762 RUBELLA ANTIBODY: CPT | Performed by: OBSTETRICS & GYNECOLOGY

## 2020-03-05 PROCEDURE — 87340 HEPATITIS B SURFACE AG IA: CPT | Performed by: OBSTETRICS & GYNECOLOGY

## 2020-03-05 PROCEDURE — 86901 BLOOD TYPING SEROLOGIC RH(D): CPT | Performed by: OBSTETRICS & GYNECOLOGY

## 2020-03-05 PROCEDURE — 86780 TREPONEMA PALLIDUM: CPT | Performed by: OBSTETRICS & GYNECOLOGY

## 2020-03-05 SDOH — SOCIAL STABILITY: SOCIAL INSECURITY: WITHIN THE LAST YEAR, HAVE YOU BEEN AFRAID OF YOUR PARTNER OR EX-PARTNER?: NO

## 2020-03-05 SDOH — SOCIAL STABILITY: SOCIAL INSECURITY: WITHIN THE LAST YEAR, HAVE YOU BEEN HUMILIATED OR EMOTIONALLY ABUSED IN OTHER WAYS BY YOUR PARTNER OR EX-PARTNER?: NO

## 2020-03-05 SDOH — SOCIAL STABILITY: SOCIAL INSECURITY
WITHIN THE LAST YEAR, HAVE YOU BEEN KICKED, HIT, SLAPPED, OR OTHERWISE PHYSICALLY HURT BY YOUR PARTNER OR EX-PARTNER?: NO

## 2020-03-05 SDOH — SOCIAL STABILITY: SOCIAL INSECURITY
WITHIN THE LAST YEAR, HAVE TO BEEN RAPED OR FORCED TO HAVE ANY KIND OF SEXUAL ACTIVITY BY YOUR PARTNER OR EX-PARTNER?: NO

## 2020-03-05 SDOH — SOCIAL STABILITY: SOCIAL NETWORK: ARE YOU MARRIED, WIDOWED, DIVORCED, SEPARATED, NEVER MARRIED, OR LIVING WITH A PARTNER?: MARRIED

## 2020-03-05 ASSESSMENT — MIFFLIN-ST. JEOR: SCORE: 1562.25

## 2020-03-06 LAB
BACTERIA SPEC CULT: NORMAL
RUBV IGG SERPL IA-ACNC: 36 IU/ML
SPECIMEN SOURCE: NORMAL
T PALLIDUM AB SER QL: NONREACTIVE

## 2020-03-08 NOTE — RESULT ENCOUNTER NOTE
Hood Hugo,  Congratulations!  Your prenatal labs are normal.  I look forward to seeing you soon.  Dr. Bentio

## 2020-03-27 ENCOUNTER — PRENATAL OFFICE VISIT (OUTPATIENT)
Dept: OBGYN | Facility: CLINIC | Age: 35
End: 2020-03-27
Payer: COMMERCIAL

## 2020-03-27 ENCOUNTER — ANCILLARY PROCEDURE (OUTPATIENT)
Dept: ULTRASOUND IMAGING | Facility: CLINIC | Age: 35
End: 2020-03-27
Attending: OBSTETRICS & GYNECOLOGY
Payer: COMMERCIAL

## 2020-03-27 VITALS
OXYGEN SATURATION: 99 % | HEIGHT: 69 IN | SYSTOLIC BLOOD PRESSURE: 97 MMHG | DIASTOLIC BLOOD PRESSURE: 58 MMHG | BODY MASS INDEX: 26.35 KG/M2 | TEMPERATURE: 98 F | WEIGHT: 177.9 LBS | HEART RATE: 89 BPM

## 2020-03-27 DIAGNOSIS — Z34.80 SUPERVISION OF OTHER NORMAL PREGNANCY, ANTEPARTUM: ICD-10-CM

## 2020-03-27 DIAGNOSIS — O02.1 MISSED ABORTION: Primary | ICD-10-CM

## 2020-03-27 PROCEDURE — 99201 ZZC OFFICE/OUTPT VISIT, NEW, LEVEL I: CPT | Performed by: OBSTETRICS & GYNECOLOGY

## 2020-03-27 PROCEDURE — 76830 TRANSVAGINAL US NON-OB: CPT | Performed by: OBSTETRICS & GYNECOLOGY

## 2020-03-27 ASSESSMENT — PATIENT HEALTH QUESTIONNAIRE - PHQ9
5. POOR APPETITE OR OVEREATING: NOT AT ALL
SUM OF ALL RESPONSES TO PHQ QUESTIONS 1-9: 4

## 2020-03-27 ASSESSMENT — ANXIETY QUESTIONNAIRES
7. FEELING AFRAID AS IF SOMETHING AWFUL MIGHT HAPPEN: NOT AT ALL
3. WORRYING TOO MUCH ABOUT DIFFERENT THINGS: NOT AT ALL
5. BEING SO RESTLESS THAT IT IS HARD TO SIT STILL: NOT AT ALL
GAD7 TOTAL SCORE: 1
2. NOT BEING ABLE TO STOP OR CONTROL WORRYING: NOT AT ALL
1. FEELING NERVOUS, ANXIOUS, OR ON EDGE: NOT AT ALL
6. BECOMING EASILY ANNOYED OR IRRITABLE: SEVERAL DAYS

## 2020-03-27 ASSESSMENT — MIFFLIN-ST. JEOR: SCORE: 1566.33

## 2020-03-27 NOTE — PATIENT INSTRUCTIONS
Spontaneous  is the most common complication of early pregnancy. The frequency decreases with increasing gestational age. The incidence of spontaneous  (miscarriage) in clinically recognized pregnancies up to 20 gestational weeks is 8 to 20 percent. However, the incidence among women who have previously had a child is much lower (5 percent). The overall risk of spontaneous  after 15 weeks is low (about 0.6 percent) for chromosomally and structurally normal fetuses, but varies according to maternal age and ethnicity   If preimplantation losses are considered, approximately 50 percent of fertilized oocytes do not result in a live birth.  Advancing maternal age is the most important risk factor for spontaneous miscarriage in healthy women. The effect of maternal age on pregnancy outcome was illustrated in a review of over 1 million pregnancies with known outcomes involving admission to a hospital [10]. The overall rate of spontaneous  was 11 percent and the approximate frequencies of clinically recognized miscarriage according to maternal age were: age 20 to 30 years (9 to 17 percent), age 35 years (20 percent), age 40 years (40 percent), and age 45 years (80 percent)  The risk of miscarriage in future pregnancy is approximately 20 percent after one miscarriage, 28 percent after two consecutive miscarriages, and 43 percent after three or more consecutive miscarriages   Chromosomal abnormalities account for approximately 50 percent of all miscarriages  The earlier  occurs, the higher the incidence of cytogenetic defects. In one study, for example, the prevalence of abnormal fetal karyotypes is 90 percent in empty sac pregnancies, 50 percent in abortions occurring at 8 to 11 weeks of gestation, and 30 percent in those occurring at 16 to 19 weeks.

## 2020-03-28 PROBLEM — Z34.80 SUPERVISION OF OTHER NORMAL PREGNANCY, ANTEPARTUM: Status: RESOLVED | Noted: 2020-03-27 | Resolved: 2020-03-28

## 2020-03-28 RX ORDER — MISOPROSTOL 200 UG/1
800 TABLET ORAL ONCE
Qty: 1 TABLET | Refills: 1 | Status: SHIPPED | OUTPATIENT
Start: 2020-03-28 | End: 2020-08-17

## 2020-03-28 RX ORDER — IBUPROFEN 800 MG/1
800 TABLET, FILM COATED ORAL EVERY 8 HOURS PRN
Qty: 30 TABLET | Refills: 0 | Status: SHIPPED | OUTPATIENT
Start: 2020-03-28 | End: 2020-08-17

## 2020-03-28 ASSESSMENT — ANXIETY QUESTIONNAIRES: GAD7 TOTAL SCORE: 1

## 2020-03-28 NOTE — PROGRESS NOTES
"Labs were reviewed and normal. Has been feeling nauseated but normally that resolves by 14 weeks so not surprised. Has history of LGA babies. Discussed COVID pandemic. Recommend social distancing, shelter in place as much as possible, call clinic with concerns.     EXAM:  Blood pressure 97/58, pulse 89, temperature 98  F (36.7  C), temperature source Oral, height 1.753 m (5' 9\"), weight 80.7 kg (177 lb 14.4 oz), last menstrual period 2020, SpO2 99 %, not currently breastfeeding.   BMI= Body mass index is 26.27 kg/m .  General - pleasant female in no acute distress.  Neck - supple without lymphadenopathy or thyromegaly.  Lungs - clear to auscultation bilaterally.  Heart - regular rate and rhythm without murmur.  Breast - no nodularity, asymmetry or nipple discharge bilaterally.  Abdomen - soft, nontender, nondistended, no hepatosplenomegaly.  Pelvic - EG: normal adult female, BUS: within normal limits, Vagina: well rugated, no discharge, Cervix: no lesions or CMT  Rectovaginal - deferred.  Musculoskeletal - no gross deformities.  Neurological - normal strength, sensation, and mental status.    Doptones were not heard  BSUS unable to see fetus    Formal ultrasound obtained showing cazares IUP measuring 8 weeks 0 days with no cardiac activity consistent with missed AB    ASSESSMENT/PLAN:  (O02.1) Missed   (primary encounter diagnosis)  Comment: 8 wks  Plan: misoprostol (CYTOTEC) 200 MCG tablet, ibuprofen        (ADVIL/MOTRIN) 800 MG tablet        Patient was very upset.  Reports miscarriage between last 2 pregnancies but had positive pregnancy test and then woke up with bleeding.  Does not understand why she is not bleeding now.  Phone visit done with spouse in the room and discussed options.    Discussed most common cause of miscarriages chromosomal abnormality.  Reassured she did not do anything wrong.  Questions were answered.    - Patient would like to do misoprostol. Discussed instructions: 4 " tablets vaginally or bucally all at once. If no or minimal symptoms in 24 hours, refill and repeat. Discussed anticipated course. Discussed risks, including heavy bleeding or insufficient effect. Discussed likelihood of success and possible need for D&C.   Because of Covid,, if she feels like miscarriage is completed we will plan pregnancy test in 3 weeks and let us know if positive.  Reviewed okay to try for pregnancy again after negative pregnancy test obtained.  We will plan for early ultrasound approximately 7 weeks with next pregnancy for heartbeat.    Blood type A+    Samara Orozco MD

## 2020-08-13 ENCOUNTER — E-VISIT (OUTPATIENT)
Dept: FAMILY MEDICINE | Facility: CLINIC | Age: 35
End: 2020-08-13
Payer: COMMERCIAL

## 2020-08-13 DIAGNOSIS — O20.0 THREATENED ABORTION: ICD-10-CM

## 2020-08-13 DIAGNOSIS — Z32.01 PREGNANCY TEST POSITIVE: Primary | ICD-10-CM

## 2020-08-13 PROCEDURE — 99207 ZZC NO BILLABLE SERVICE THIS VISIT: CPT | Performed by: FAMILY MEDICINE

## 2020-09-28 ENCOUNTER — TRANSCRIBE ORDERS (OUTPATIENT)
Dept: MATERNAL FETAL MEDICINE | Facility: CLINIC | Age: 35
End: 2020-09-28

## 2020-09-28 ENCOUNTER — PRENATAL OFFICE VISIT (OUTPATIENT)
Dept: NURSING | Facility: CLINIC | Age: 35
End: 2020-09-28
Payer: COMMERCIAL

## 2020-09-28 VITALS — BODY MASS INDEX: 25.18 KG/M2 | WEIGHT: 170 LBS | HEIGHT: 69 IN

## 2020-09-28 DIAGNOSIS — O26.90 PREGNANCY RELATED CONDITION, ANTEPARTUM: Primary | ICD-10-CM

## 2020-09-28 DIAGNOSIS — Z23 NEED FOR TDAP VACCINATION: ICD-10-CM

## 2020-09-28 DIAGNOSIS — O09.529 AMA (ADVANCED MATERNAL AGE) MULTIGRAVIDA 35+: Primary | ICD-10-CM

## 2020-09-28 PROCEDURE — 99207 ZZC NO CHARGE NURSE ONLY: CPT

## 2020-09-28 SDOH — HEALTH STABILITY: MENTAL HEALTH
STRESS IS WHEN SOMEONE FEELS TENSE, NERVOUS, ANXIOUS, OR CAN'T SLEEP AT NIGHT BECAUSE THEIR MIND IS TROUBLED. HOW STRESSED ARE YOU?: NOT AT ALL

## 2020-09-28 ASSESSMENT — MIFFLIN-ST. JEOR: SCORE: 1525.49

## 2020-09-28 NOTE — PROGRESS NOTES
Important Information for Provider:     Patient presents for new ob teaching and labs, fifth pregnancy,AMA.  Recent SAB 3/5/2020. Ultrasound and review results scheduled for 10/06/2020 Ordered first trimester screening/NIPT. Handouts reviewed and mailed. Recommended B6, Unisom for nausea. TSH drawn with NOB labs, strong family history of thyroid disease. Has NOB with Dr Benito 10/28/2020    Caffeine intake/servings daily - 1/2 cup daily  Calcium intake/servings daily - 3  Exercise 4 times weekly - describe ; yoga, cardio, walks, precautions given  Sunscreen used - Yes  Seatbelts used - Yes  Guns stored in the home - No  Self Breast Exam - Yes  Pap test up to date -  Yes  Eye exam up to date -  Yes  Dental exam up to date -  Yes  Immunizations reviewed and up to date - Yes  Abuse: Current or Past (Physical, Sexual or Emotional) - No  Do you feel safe in your environment - Yes  Do you cope well with stress - Yes  Do you suffer from insomnia - No        Prenatal OB Questionnaire  Patient supplied answers from flow sheet for:  Prenatal OB Questionnaire.  Past Medical History  Diabetes?: No  Hypertension : No  Heart disease, mitral valve prolapse or rheumatic fever?: No  An autoimmune disease such as lupus or rheumatoid arthritis?: No  Kidney disease or urinary tract infection?: No  Epilepsy, seizures or spells?: No  Migraine headaches?: No  A stroke or loss of function or sensation?: No  Any other neurological problems?: No  Have you ever been treated for depression?: (!) Yes  Are you having problems with crying spells or loss of self-esteem?: No  Have you ever required psychiatric care?: 14 years old for eating disorder   Have you ever had hepatitis, liver disease or jaundice?: No  Have you been treated for blood clots in your veins, deep vein thromosis, inflammation in the veins, thrombosis, phlebitis, pulmonary embolism or varicosities?: No  Have you had excessive bleeding after surgery or dental work?: No  Do you  bleed more than other women after a cut or scratch?: No  Do you have a history of anemia?: No  Have you ever had thyroid problems or taken thyroid medication?: No   Do you have any endocrine problems?: No  Have you ever been in a major accident or suffered serious trauma?: No  Within the last year, has anyone hit, slapped, kicked or otherwise hurt you?: No  In the last year, has anyone forced you to have sex when you didn't want to?: No    Past Medical History 2   Have you ever received a blood transfusion?: No  Would you refuse a blood transfusion if a doctor judged it to be medically necessary?: No   If you answered Yes, would you rather die than receive a blood transfusion?: No  If you answered Yes, is this for Spiritism reasons?: No  Does anyone in your home smoke?: No  Do you use tobacco products?: No  Do you drink beer, wine or hard liquor?: No  Do you use any of the following: marijuana, speed, cocaine, heroin, hallucinogens or other drugs?: No   Is your blood type Rh negative?: No  Have you ever had abnormal antibodies in your blood?: No  Have you ever had asthma?: No  Have you ever had tuberculosis?: No  Do you have any allergies to drugs or over-the-counter medications?: (!) Yes(sulfa)  Allergies: Dust Mites, Aspartame, Ethanol, Venlafaxine, Hydrochloride, Sertraline: No  Have you had any breast problems?: No  Have you ever ?: (!) Yes  Have you had any gynecological surgical procedures such as cervical conization, a LEEP procedure, laser treatment, cryosurgery of the cervix or a dilation and curettage, etc?: No  Have you ever had any other surgical procedures?: No  Have you been hospitalized for a nonsurgical reason excluding normal delivery?: No  Have you ever had any anesthetic complications?: No  Have you ever had an abnormal pap smear?: No    Past Medical History (Continued)  Do you have a history of abnormalities of the uterus?: No  Did your mother take YUKI or any other hormones when she was  pregnant with you?: No  Did it take you more than a year to become pregnant?: No  Have you ever been evaluated or treated for infertility?: No  Is there a history of medical problems in your family, which you feel may be important to this pregnancy?: No  Do you have any other problems we have not asked about which you feel may be important to this pregnancy?: No    Symptoms since last menstrual period  Do you have any of the following symptoms: abdominal pain, blood in stools or urine, chest pain, shortness of breath, coughing or vomiting up blood, your heart racing or skipping beats, nausea and vomiting, pain on urination or vaginal discharge or bleed: (!) Yes  Will the patient be 35 years old or older at the time of delivery?: (!) Yes    Has the patient, baby's father or anyone in either family had:  Thalassemia (Italian, Greek, Mediterranean or  background only) and an MCV result less than 80?: No  Neural tube defect such as meningomyelocele, spina bifida or anencephaly?: No  Congenital heart defect?: No  Down's Syndrome?: No  Meet-Sachs disease (Sabianism, Cajun, [place]anda[place])?: No  Sickle cell disease or trait ()?: No  Hemophilia or other inherited problems of blood?: No  Muscular dystrophy?: No  Cystic fibrosis?: No  Ward's chorea?: No  Mental retardation/autism?: No  If yes, was the person tested for fragile X?: No  Any other inherited genetic or chromosomal disorder?: No  Maternal metabolic disorder (e.g Insulin-dependent diabetes, PKU)?: No  A child with birth defects not listed above?: No  Recurrent pregnancy loss or stillbirth?: Mother in law had multiple SAB's   Has the patient had any medications/street drugs/alcohol since her last menstrual period?: No  Does the patient or baby's father have any other genetic risks?: No    Infection History   Do you object to being tested for Hepatitis B?: No  Do you object to being tested for HIV?: No   Do you feel that you are at high risk for coming  in contact with the AIDS virus?: No  Have you ever been treated for tuberculosis?: No  Have you ever had a positive skin test for tuberculosis?: No  Do you live with someone who has tuberculosis?: No  Have you ever been exposed to tuberculosis?: No  Do you have genital herpes?: No  Does your partner have genital herpes?: No  Have you ever had gonorrhea, chlamydia, syphilis, venereal warts, trichomoniasis, pelvic inflammatory disease or any other sexually transmitted disease?: No  Do you know if you are a genital group B streptococcus carrier?: No  Have you had chicken pox/varicella?: (!) Yes   Have you been vaccinated against chicken Pox?: No  Have you had any other infectious diseases?: No      Allergies as of 9/28/2020:    Allergies as of 09/28/2020 - Reviewed 09/28/2020   Allergen Reaction Noted     Milk protein extract Nausea and Vomiting, Hives, Itching, Swelling, and GI Disturbance 01/22/2019     Onion GI Disturbance 01/22/2019     Sulfa drugs Hives 09/23/2010       Current medications are:  Current Outpatient Medications   Medication Sig Dispense Refill     Prenatal MV-Min-Fe Fum-FA-DHA (PRENATAL+DHA PO)        sertraline (ZOLOFT) 50 MG tablet Take 1/2 tablet (25 mg) for 1-2 weeks, then increase to 1 tablet orally daily 30 tablet 1         Early ultrasound screening tool:    Does patient have irregular periods?  No  Did patient use hormonal birth control in the three months prior to positive urine pregnancy test? No  Is the patient breastfeeding?  No  Is the patient 10 weeks or greater at time of education visit?  No

## 2020-10-06 ENCOUNTER — OFFICE VISIT (OUTPATIENT)
Dept: MIDWIFE SERVICES | Facility: CLINIC | Age: 35
End: 2020-10-06
Payer: COMMERCIAL

## 2020-10-06 ENCOUNTER — ANCILLARY PROCEDURE (OUTPATIENT)
Dept: ULTRASOUND IMAGING | Facility: CLINIC | Age: 35
End: 2020-10-06
Attending: OBSTETRICS & GYNECOLOGY
Payer: COMMERCIAL

## 2020-10-06 VITALS
HEART RATE: 83 BPM | BODY MASS INDEX: 26.29 KG/M2 | SYSTOLIC BLOOD PRESSURE: 97 MMHG | WEIGHT: 178 LBS | TEMPERATURE: 98.2 F | DIASTOLIC BLOOD PRESSURE: 59 MMHG

## 2020-10-06 DIAGNOSIS — O09.529 AMA (ADVANCED MATERNAL AGE) MULTIGRAVIDA 35+: ICD-10-CM

## 2020-10-06 DIAGNOSIS — Z32.01 PREGNANCY TEST POSITIVE: Primary | ICD-10-CM

## 2020-10-06 PROCEDURE — 76801 OB US < 14 WKS SINGLE FETUS: CPT

## 2020-10-06 PROCEDURE — 99212 OFFICE O/P EST SF 10 MIN: CPT | Performed by: ADVANCED PRACTICE MIDWIFE

## 2020-10-06 NOTE — NURSING NOTE
"Chief Complaint   Patient presents with     Follow Up     Ultrasound       Initial BP 97/59 (BP Location: Left arm, Patient Position: Sitting, Cuff Size: Adult Regular)   Pulse 83   Temp 98.2  F (36.8  C) (Oral)   Wt 80.7 kg (178 lb)   LMP 2020   BMI 26.29 kg/m   Estimated body mass index is 26.29 kg/m  as calculated from the following:    Height as of 20: 1.753 m (5' 9\").    Weight as of this encounter: 80.7 kg (178 lb).  BP completed using cuff size: regular    Questioned patient about current smoking habits.  Pt. has never smoked.          The following HM Due: NONE      The following patient reported/Care Every where data was sent to:  P ABSTRACT QUALITY INITIATIVES [[id number]]  TOMASA Knott MA           "

## 2020-10-06 NOTE — PROGRESS NOTES
S:  Oanh is here today after US for dating a viability. She has seen midwives in previous pregnancies but is excited to see Dr. Benito this pregnancy, who was personally recommended. Discussed recommendations for early pregnancy including nutrition, prenatal vitamin, and abstaining from substances.    O:  BP 97/59 (BP Location: Left arm, Patient Position: Sitting, Cuff Size: Adult Regular)   Pulse 83   Temp 98.2  F (36.8  C) (Oral)   Wt 80.7 kg (178 lb)   LMP 08/13/2020   BMI 26.29 kg/m      US shows early cazares IUP with yolk sac and cardiac activity measuring 7w5d with MAURICIO 5/20/21    A:  Viable IUP at 7w5d    P:  RTC for NOB with Dr. Benito on 10/28      AGUSTINA Paula CNM

## 2020-10-28 ENCOUNTER — PRENATAL OFFICE VISIT (OUTPATIENT)
Dept: OBGYN | Facility: CLINIC | Age: 35
End: 2020-10-28
Payer: COMMERCIAL

## 2020-10-28 VITALS
HEART RATE: 93 BPM | BODY MASS INDEX: 26.76 KG/M2 | WEIGHT: 180.7 LBS | SYSTOLIC BLOOD PRESSURE: 103 MMHG | HEIGHT: 69 IN | DIASTOLIC BLOOD PRESSURE: 66 MMHG

## 2020-10-28 DIAGNOSIS — O09.521 MULTIGRAVIDA OF ADVANCED MATERNAL AGE IN FIRST TRIMESTER: ICD-10-CM

## 2020-10-28 LAB
ABO + RH BLD: NORMAL
ABO + RH BLD: NORMAL
ALBUMIN UR-MCNC: NEGATIVE MG/DL
APPEARANCE UR: CLEAR
BILIRUB UR QL STRIP: NEGATIVE
BLD GP AB SCN SERPL QL: NORMAL
BLOOD BANK CMNT PATIENT-IMP: NORMAL
COLOR UR AUTO: YELLOW
ERYTHROCYTE [DISTWIDTH] IN BLOOD BY AUTOMATED COUNT: 13.1 % (ref 10–15)
GLUCOSE UR STRIP-MCNC: NEGATIVE MG/DL
HCT VFR BLD AUTO: 41.1 % (ref 35–47)
HGB BLD-MCNC: 14.1 G/DL (ref 11.7–15.7)
HGB UR QL STRIP: NEGATIVE
KETONES UR STRIP-MCNC: NEGATIVE MG/DL
LEUKOCYTE ESTERASE UR QL STRIP: ABNORMAL
MCH RBC QN AUTO: 32.1 PG (ref 26.5–33)
MCHC RBC AUTO-ENTMCNC: 34.3 G/DL (ref 31.5–36.5)
MCV RBC AUTO: 94 FL (ref 78–100)
NITRATE UR QL: NEGATIVE
PH UR STRIP: 7 PH (ref 5–7)
PLATELET # BLD AUTO: 282 10E9/L (ref 150–450)
RBC # BLD AUTO: 4.39 10E12/L (ref 3.8–5.2)
SOURCE: ABNORMAL
SP GR UR STRIP: 1.02 (ref 1–1.03)
SPECIMEN EXP DATE BLD: NORMAL
UROBILINOGEN UR STRIP-ACNC: 0.2 EU/DL (ref 0.2–1)
WBC # BLD AUTO: 8.5 10E9/L (ref 4–11)

## 2020-10-28 PROCEDURE — 87086 URINE CULTURE/COLONY COUNT: CPT | Performed by: OBSTETRICS & GYNECOLOGY

## 2020-10-28 PROCEDURE — 86762 RUBELLA ANTIBODY: CPT | Performed by: OBSTETRICS & GYNECOLOGY

## 2020-10-28 PROCEDURE — 85027 COMPLETE CBC AUTOMATED: CPT | Performed by: OBSTETRICS & GYNECOLOGY

## 2020-10-28 PROCEDURE — 36415 COLL VENOUS BLD VENIPUNCTURE: CPT | Performed by: OBSTETRICS & GYNECOLOGY

## 2020-10-28 PROCEDURE — 84443 ASSAY THYROID STIM HORMONE: CPT | Performed by: OBSTETRICS & GYNECOLOGY

## 2020-10-28 PROCEDURE — 87340 HEPATITIS B SURFACE AG IA: CPT | Performed by: OBSTETRICS & GYNECOLOGY

## 2020-10-28 PROCEDURE — 86780 TREPONEMA PALLIDUM: CPT | Mod: 90 | Performed by: OBSTETRICS & GYNECOLOGY

## 2020-10-28 PROCEDURE — 86901 BLOOD TYPING SEROLOGIC RH(D): CPT | Performed by: OBSTETRICS & GYNECOLOGY

## 2020-10-28 PROCEDURE — 86850 RBC ANTIBODY SCREEN: CPT | Performed by: OBSTETRICS & GYNECOLOGY

## 2020-10-28 PROCEDURE — 99000 SPECIMEN HANDLING OFFICE-LAB: CPT | Performed by: OBSTETRICS & GYNECOLOGY

## 2020-10-28 PROCEDURE — 99207 PR FIRST OB VISIT: CPT | Performed by: OBSTETRICS & GYNECOLOGY

## 2020-10-28 PROCEDURE — 86900 BLOOD TYPING SEROLOGIC ABO: CPT | Performed by: OBSTETRICS & GYNECOLOGY

## 2020-10-28 PROCEDURE — 87389 HIV-1 AG W/HIV-1&-2 AB AG IA: CPT | Performed by: OBSTETRICS & GYNECOLOGY

## 2020-10-28 PROCEDURE — 81003 URINALYSIS AUTO W/O SCOPE: CPT | Performed by: OBSTETRICS & GYNECOLOGY

## 2020-10-28 ASSESSMENT — MIFFLIN-ST. JEOR: SCORE: 1574.03

## 2020-10-28 ASSESSMENT — PAIN SCALES - GENERAL: PAINLEVEL: NO PAIN (0)

## 2020-10-28 NOTE — PROGRESS NOTES
"Chief Complaint   Patient presents with     Prenatal Care       Initial /66 (BP Location: Right arm, Patient Position: Sitting, Cuff Size: Adult Large)   Pulse 93   Ht 1.753 m (5' 9\")   Wt 82 kg (180 lb 11.2 oz)   LMP 2020   Breastfeeding No   BMI 26.68 kg/m   Estimated body mass index is 26.68 kg/m  as calculated from the following:    Height as of this encounter: 1.753 m (5' 9\").    Weight as of this encounter: 82 kg (180 lb 11.2 oz).  BP completed using cuff size: large    Questioned patient about current smoking habits.  Pt. has never smoked.          The following HM Due: Vaccinations: flu       The following patient reported/Care Every where data was sent to:  P ABSTRACT QUALITY INITIATIVES [[id number]]  n/a      patient has appointment for today and Pt declines to have flu shot.                "

## 2020-10-29 LAB
BACTERIA SPEC CULT: NORMAL
HBV SURFACE AG SERPL QL IA: NONREACTIVE
HIV 1+2 AB+HIV1 P24 AG SERPL QL IA: NONREACTIVE
Lab: NORMAL
RUBV IGG SERPL IA-ACNC: 27 IU/ML
SPECIMEN SOURCE: NORMAL
T PALLIDUM AB SER QL: NONREACTIVE
TSH SERPL DL<=0.005 MIU/L-ACNC: 1.65 MU/L (ref 0.4–4)

## 2020-10-30 NOTE — PROGRESS NOTES
SUBJECTIVE: Oanh Interiano is a 35 year old   here for initial OB visit.  Doing well but very anxious.   Had a miscarriage in March and very nervous for this pregnancy.   History of  x 2. No complications but didn't have easy labors. 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   First baby was VAVD. Both kids over 9 pounds.   Postpartum anxiety in the past, has used zoloft.     Past Medical History:   Diagnosis Date     ASCUS with positive high risk HPV 3/2013    + HPV 59, 62     Papanicolaou smear of cervix with low grade squamous intraepithelial lesion (LGSIL) 2010    colp WNL       Past Surgical History:   Procedure Laterality Date     NO HISTORY OF SURGERY         Family History   Problem Relation Age of Onset     Thyroid Disease Mother         hypothyroidism     Circulatory Mother      Depression Mother      Neurologic Disorder Mother      Psychotic Disorder Mother      Skin Cancer Mother      Thyroid Disease Sister      Depression Sister      Hypertension Father      Lipids Father      Diabetes Paternal Grandmother      Cerebrovascular Disease Paternal Grandmother      Alcohol/Drug Paternal Grandmother      Cerebrovascular Disease Paternal Grandfather      Alcohol/Drug Paternal Grandfather      Circulatory Paternal Grandfather      Eye Disorder Paternal Grandfather      Cancer - colorectal Maternal Grandfather      Allergies Sister      Abdominal Aortic Aneurysm Sister      Thyroid Disease Sister         thyroidectomy     Depression Sister      Alzheimer Disease Maternal Grandmother        Social History     Socioeconomic History     Marital status:      Spouse name: Not on file     Number of children: 0     Years of education: Not on file     Highest education level: Not on file   Occupational History     Employer: [employer]   Social Needs     Financial resource strain: Not on file     Food insecurity     Worry: Not on file     Inability: Not  "on file     Transportation needs     Medical: Not on file     Non-medical: Not on file   Tobacco Use     Smoking status: Never Smoker     Smokeless tobacco: Never Used   Substance and Sexual Activity     Alcohol use: Not Currently     Comment: occ     Drug use: No     Sexual activity: Yes     Partners: Male   Lifestyle     Physical activity     Days per week: Not on file     Minutes per session: Not on file     Stress: Not at all   Relationships     Social connections     Talks on phone: Not on file     Gets together: Not on file     Attends Mormon service: Not on file     Active member of club or organization: Not on file     Attends meetings of clubs or organizations: Not on file     Relationship status:      Intimate partner violence     Fear of current or ex partner: No     Emotionally abused: No     Physically abused: No     Forced sexual activity: No   Other Topics Concern     Parent/sibling w/ CABG, MI or angioplasty before 65F 55M? Not Asked   Social History Narrative     Not on file         Current Outpatient Medications:      Prenatal MV-Min-Fe Fum-FA-DHA (PRENATAL+DHA PO), , Disp: , Rfl:      sertraline (ZOLOFT) 50 MG tablet, Take 1/2 tablet (25 mg) for 1-2 weeks, then increase to 1 tablet orally daily, Disp: 30 tablet, Rfl: 1    Allergies   Allergen Reactions     Milk Protein Extract Nausea and Vomiting, Hives, Itching, Swelling and GI Disturbance     Itching, hives, swelling, GI distrubance     Onion GI Disturbance     Sulfa Drugs Hives         Past Medical History of Father of Baby: No significant medical history    Review of Systems:   Constitutional, HEENT, cardiovascular, pulmonary, gi and gu systems are negative, except as otherwise noted.     History Since Last Menstrual Period: No Problems    EXAM:   Vitals:    10/28/20 1240   BP: 103/66   BP Location: Right arm   Patient Position: Sitting   Cuff Size: Adult Large   Pulse: 93   Weight: 82 kg (180 lb 11.2 oz)   Height: 1.753 m (5' 9\") "     Body mass index is 26.68 kg/m .  GENERAL APPEARANCE: healthy, alert and no distress  EYES: EOMI,  PERRL  NECK: no adenopathy, no asymmetry, masses, or scars and thyroid normal in appearance  RESP: no increased work of breathing  CV: regular rates and rhythm, normal S1 S2, no S3 or S4 and no murmur, click or rub -  ABDOMEN: no edema or JVD  MS: extremities normal- no gross deformities noted, no evidence of inflammation in joints, FROM in all extremities.  SKIN: no suspicious lesions or rashes  NEURO: Normal strength and tone, sensory exam grossly normal, mentation intact and speech normal  PSYCH: mentation appears normal and affect normal/bright      Pelvix exam:  Deferred.     Ultrasound: Informal for heart tones. Viable cazares IUP c/w prior dates.     ASSESSMENT/ PLAN:  Oanh Interiano is a 35 year old   at 10 weeks 5 days by LMP c/w 8 week ultrasound. EDC 21  Follow up in 4 weeks.  Normal exercise.  Normal sexual activity.  Prenatal vitamins.  Anticipated weight gain:  BMI 25-29.9: 15-25 pounds  Recommend flu shot for her and family  TDaP 27-36 weeks  Oriented to practice, PNC  Discussed aneuploidy screening, has first tri screen scheduled  Monitor anxiety symptoms. Feeling much better seeing baby on ultrasound today.   Prenatal labs today.

## 2020-11-02 ENCOUNTER — PRE VISIT (OUTPATIENT)
Dept: MATERNAL FETAL MEDICINE | Facility: CLINIC | Age: 35
End: 2020-11-02

## 2020-11-04 ENCOUNTER — HOSPITAL ENCOUNTER (OUTPATIENT)
Dept: ULTRASOUND IMAGING | Facility: CLINIC | Age: 35
End: 2020-11-04
Attending: OBSTETRICS & GYNECOLOGY
Payer: COMMERCIAL

## 2020-11-04 ENCOUNTER — OFFICE VISIT (OUTPATIENT)
Dept: MATERNAL FETAL MEDICINE | Facility: CLINIC | Age: 35
End: 2020-11-04
Attending: OBSTETRICS & GYNECOLOGY
Payer: COMMERCIAL

## 2020-11-04 DIAGNOSIS — O26.90 PREGNANCY RELATED CONDITION, ANTEPARTUM: ICD-10-CM

## 2020-11-04 DIAGNOSIS — O09.521 SUPERVISION OF ELDERLY MULTIGRAVIDA IN FIRST TRIMESTER: Primary | ICD-10-CM

## 2020-11-04 DIAGNOSIS — Z36.82 ENCOUNTER FOR NUCHAL TRANSLUCENCY TESTING: ICD-10-CM

## 2020-11-04 DIAGNOSIS — O09.521 SUPERVISION OF ELDERLY MULTIGRAVIDA IN FIRST TRIMESTER: ICD-10-CM

## 2020-11-04 PROCEDURE — 99000 SPECIMEN HANDLING OFFICE-LAB: CPT | Performed by: OBSTETRICS & GYNECOLOGY

## 2020-11-04 PROCEDURE — 96040 HC GENETIC COUNSELING, EACH 30 MINUTES: CPT | Performed by: GENETIC COUNSELOR, MS

## 2020-11-04 PROCEDURE — 99207 PR NO CHARGE LOS: CPT | Performed by: OBSTETRICS & GYNECOLOGY

## 2020-11-04 PROCEDURE — 99N1100 PR STATISTIC VERIFI PRENATAL TRISOMY 21,18,13: Mod: 90 | Performed by: OBSTETRICS & GYNECOLOGY

## 2020-11-04 PROCEDURE — 76813 OB US NUCHAL MEAS 1 GEST: CPT

## 2020-11-04 PROCEDURE — 36415 COLL VENOUS BLD VENIPUNCTURE: CPT | Performed by: OBSTETRICS & GYNECOLOGY

## 2020-11-04 PROCEDURE — 99207 PR NO CHARGE LOS: CPT | Performed by: GENETIC COUNSELOR, MS

## 2020-11-04 PROCEDURE — 76813 OB US NUCHAL MEAS 1 GEST: CPT | Mod: 26 | Performed by: OBSTETRICS & GYNECOLOGY

## 2020-11-04 NOTE — PROGRESS NOTES
[facility]  Genetic Counseling Consult    Patient: Oanh Interiano YOB: 1985   Date of Service: 20      Oanh Interiano was seen at [facility] for genetic consultation to discuss the options for screening and testing for fetal chromosome abnormalities.  The indication for genetic counseling is advanced maternal age.        Impression/Plan:   1.  Oanh had an ultrasound and blood draw for NIPT (Innatal test through [email or web address]).  Results are expected within 5-7 days, and will be available in [facility].  We will contact her to discuss the results, and a copy will be forwarded to the office of the referring OB provider.  Oanh will be contacted at the phone number she provided, [phone], and requests that detailed results, including fetal sex chromosomes indicated by testing, be left in her voicemail if she cannot be reached.    2.  Maternal serum AFP (single marker screen) is recommended after 15 weeks to screen for open neural tube defects. A quad screen should not be performed.    3.  An 18-20 week comprehensive ultrasound is standard of care for all women 35 or older at delivery.    Pregnancy History:   /Parity:    Age at Delivery: 35 year old  MAURICIO: 2021, by Last Menstrual Period  Gestational Age: 11w6d    No significant complications or exposures were reported in the current pregnancy.    Oanh s pregnancy history is significant for 2 prior full term deliveries with no reported complications and 2 prior SAB with no known cause.    Medical History:   Oanh ridley reported medical history is not expected to impact pregnancy management or risks to fetal development.       Family History:   A three-generation pedigree was obtained, and is scanned under the  Media  tab.   The following significant findings were reported by Oanh:    Oanh reports a maternal first cousin with cleft palate and autism  spectrum disorder.  He is otherwise healthy with no known concerns, and there is no other known family history of either facial clefts or ASD.  We reviewed that both of these conditions can be seen to have multifactorial inheritance, and discussed that the risks for her children to have similar concerns is likely low, but cannot be ruled out.     Otherwise, the reported family history is negative for multiple miscarriages, stillbirths, birth defects, cognitive impairment, known genetic conditions, and consanguinity.       Carrier Screening:       Expanded carrier screening for mutations in a large panel of genes associated with autosomal recessive conditions including cystic fibrosis, spinal muscular atrophy, and others, is now available.      Carrier screening was not discussed today.       Risk Assessment for Chromosome Conditions:   We explained that the risk for fetal chromosome abnormalities increases with maternal age. We discussed specific features of common chromosome abnormalities, including Down syndrome, trisomy 13, trisomy 18, and sex chromosome trisomies.      - At age 35 at midtrimester, the risk to have a baby with Down syndrome is 1 in 216.     - At age 35 at midtrimester, the risk to have a baby with any chromosome abnormality is 1 in 105.          Testing Options:   We discussed the following options:   Non-invasive Prenatal Testing (NIPT)    Maternal plasma cell-free DNA testing; first trimester ultrasound with nuchal translucency and nasal bone assessment is recommended, when appropriate    Screens for fetal trisomy 21, trisomy 13, trisomy 18, and sex chromosome aneuploidy    Cannot screen for open neural tube defects; maternal serum AFP after 15 weeks is recommended       Genetic Amniocentesis    Invasive procedure typically performed in the second trimester by which amniotic fluid is obtained for the purpose of chromosome analysis and/or other prenatal genetic analysis    Diagnostic results;  >99% sensitivity for fetal chromosome abnormalities    AFAFP measurement tests for open neural tube defects       Comprehensive (Level II) ultrasound: Detailed ultrasound performed between 18-22 weeks gestation to screen for major birth defects and markers for aneuploidy.        We reviewed the benefits and limitations of this testing.  Screening tests provide a risk assessment specific to the pregnancy for certain fetal chromosome abnormalities, but cannot definitively diagnose or exclude a fetal chromosome abnormality.  Follow-up genetic counseling and consideration of diagnostic testing is recommended with any abnormal screening result.     Diagnostic tests carry inherent risks- including risk of miscarriage- that require careful consideration.  These tests can detect fetal chromosome abnormalities with greater than 99% certainty.  Results can be compromised by maternal cell contamination or mosaicism, and are limited by the resolution of cytogenetic G-banding technology.  There is no screening nor diagnostic test that can detect all forms of birth defects or mental disability.     It was a pleasure to be involved with Oanh ridley care. Face-to-face time of the meeting was 35 minutes.      Dheeraj Mora MS, [facility]  Licensed Genetic Counselor  Phone: [phone]  Pager: [phone]

## 2020-11-04 NOTE — PROGRESS NOTES
Please refer to ultrasound report under 'Imaging' Studies of 'Chart Review' tabs.    Lenard Wheatley M.D.

## 2020-11-07 ENCOUNTER — MYC MEDICAL ADVICE (OUTPATIENT)
Dept: FAMILY MEDICINE | Facility: CLINIC | Age: 35
End: 2020-11-07

## 2020-11-09 ENCOUNTER — TELEPHONE (OUTPATIENT)
Dept: MATERNAL FETAL MEDICINE | Facility: CLINIC | Age: 35
End: 2020-11-09

## 2020-11-09 LAB — LAB SCANNED RESULT: NORMAL

## 2020-11-09 NOTE — TELEPHONE ENCOUNTER
11/9/2020       Called Oanh to discuss NIPT results.  Results came back negative for chromosome abnormalities in chromosomes 21, 18, & 13, as well as the sex chromosomes.  These test results do not definitively rule out the possibility of one of these conditions, but they do greatly reduce the likelihood.  The test identified sex chromosomes consistent with male sex (XY).  Oanh had no questions at this time and was encouraged to reach out if she has any questions or concerns in the future.       Dheeraj Mora MS, [facility]  Licensed Genetic Counselor  Phone: [phone]  Pager: [phone]

## 2020-11-18 ENCOUNTER — PRENATAL OFFICE VISIT (OUTPATIENT)
Dept: OBGYN | Facility: CLINIC | Age: 35
End: 2020-11-18
Payer: COMMERCIAL

## 2020-11-18 VITALS
SYSTOLIC BLOOD PRESSURE: 117 MMHG | DIASTOLIC BLOOD PRESSURE: 70 MMHG | HEART RATE: 78 BPM | WEIGHT: 184 LBS | BODY MASS INDEX: 27.17 KG/M2

## 2020-11-18 DIAGNOSIS — O09.521 MULTIGRAVIDA OF ADVANCED MATERNAL AGE IN FIRST TRIMESTER: Primary | ICD-10-CM

## 2020-11-18 PROCEDURE — 99207 PR PRENATAL VISIT: CPT | Performed by: OBSTETRICS & GYNECOLOGY

## 2020-11-18 NOTE — PROGRESS NOTES
Having a boy this time! Gained 30 and 35 lbs with last pregnancies. Wt gain graph reviewed and doing okay, little ahead. Has level 2 scheduled for 12/21, discussed phone visit about 4 weeks and in person at 24 weeks for GCT. She is agreeable to modified care. Plans to get flu shot at pharmacy. BE

## 2020-12-14 ENCOUNTER — HEALTH MAINTENANCE LETTER (OUTPATIENT)
Age: 35
End: 2020-12-14

## 2020-12-17 ENCOUNTER — PRENATAL OFFICE VISIT (OUTPATIENT)
Dept: OBGYN | Facility: CLINIC | Age: 35
End: 2020-12-17
Payer: COMMERCIAL

## 2020-12-17 DIAGNOSIS — O09.522 MULTIGRAVIDA OF ADVANCED MATERNAL AGE IN SECOND TRIMESTER: Primary | ICD-10-CM

## 2020-12-17 PROCEDURE — 99207 PR PRENATAL VISIT: CPT | Performed by: OBSTETRICS & GYNECOLOGY

## 2020-12-17 NOTE — PROGRESS NOTES
Phone visit for modified prenatal care for COVID. 8 minutes  Doing ok.  A little anxious since she hasn't felt fetal movement yet. Discussed it is still early for that and should happen soon.   Fetal survey next week.   Stable on zoloft.   Alternating diarrhea and constipation, will try fiber.   Will try taking PNV at lunch or evening instead of morning.  RTC 3 weeks.

## 2020-12-21 ENCOUNTER — HOSPITAL ENCOUNTER (OUTPATIENT)
Dept: ULTRASOUND IMAGING | Facility: CLINIC | Age: 35
End: 2020-12-21
Attending: OBSTETRICS & GYNECOLOGY
Payer: COMMERCIAL

## 2020-12-21 ENCOUNTER — OFFICE VISIT (OUTPATIENT)
Dept: MATERNAL FETAL MEDICINE | Facility: CLINIC | Age: 35
End: 2020-12-21
Attending: OBSTETRICS & GYNECOLOGY
Payer: COMMERCIAL

## 2020-12-21 DIAGNOSIS — O09.521 SUPERVISION OF ELDERLY MULTIGRAVIDA IN FIRST TRIMESTER: ICD-10-CM

## 2020-12-21 DIAGNOSIS — O09.521 SUPERVISION OF ELDERLY MULTIGRAVIDA IN FIRST TRIMESTER: Primary | ICD-10-CM

## 2020-12-21 PROCEDURE — 76811 OB US DETAILED SNGL FETUS: CPT | Mod: 26 | Performed by: OBSTETRICS & GYNECOLOGY

## 2020-12-21 PROCEDURE — 76811 OB US DETAILED SNGL FETUS: CPT

## 2021-01-13 ENCOUNTER — PRENATAL OFFICE VISIT (OUTPATIENT)
Dept: OBGYN | Facility: CLINIC | Age: 36
End: 2021-01-13
Payer: COMMERCIAL

## 2021-01-13 VITALS
BODY MASS INDEX: 28.13 KG/M2 | DIASTOLIC BLOOD PRESSURE: 56 MMHG | HEIGHT: 69 IN | SYSTOLIC BLOOD PRESSURE: 115 MMHG | TEMPERATURE: 99.4 F | HEART RATE: 84 BPM | WEIGHT: 189.9 LBS

## 2021-01-13 DIAGNOSIS — O09.522 MULTIGRAVIDA OF ADVANCED MATERNAL AGE IN SECOND TRIMESTER: Primary | ICD-10-CM

## 2021-01-13 PROCEDURE — 99207 PR PRENATAL VISIT: CPT | Performed by: OBSTETRICS & GYNECOLOGY

## 2021-01-13 RX ORDER — BREAST PUMP
1 EACH MISCELLANEOUS PRN
Qty: 1 EACH | Refills: 0 | Status: SHIPPED | OUTPATIENT
Start: 2021-01-13 | End: 2021-04-27

## 2021-01-13 RX ORDER — BREAST PUMP
1 EACH MISCELLANEOUS PRN
Qty: 1 EACH | Refills: 0 | Status: SHIPPED | OUTPATIENT
Start: 2021-01-13 | End: 2021-01-13

## 2021-01-13 ASSESSMENT — MIFFLIN-ST. JEOR: SCORE: 1615.76

## 2021-01-13 ASSESSMENT — ANXIETY QUESTIONNAIRES
3. WORRYING TOO MUCH ABOUT DIFFERENT THINGS: SEVERAL DAYS
6. BECOMING EASILY ANNOYED OR IRRITABLE: MORE THAN HALF THE DAYS
2. NOT BEING ABLE TO STOP OR CONTROL WORRYING: SEVERAL DAYS
5. BEING SO RESTLESS THAT IT IS HARD TO SIT STILL: SEVERAL DAYS
GAD7 TOTAL SCORE: 8
IF YOU CHECKED OFF ANY PROBLEMS ON THIS QUESTIONNAIRE, HOW DIFFICULT HAVE THESE PROBLEMS MADE IT FOR YOU TO DO YOUR WORK, TAKE CARE OF THINGS AT HOME, OR GET ALONG WITH OTHER PEOPLE: SOMEWHAT DIFFICULT
1. FEELING NERVOUS, ANXIOUS, OR ON EDGE: SEVERAL DAYS
7. FEELING AFRAID AS IF SOMETHING AWFUL MIGHT HAPPEN: SEVERAL DAYS

## 2021-01-13 ASSESSMENT — PATIENT HEALTH QUESTIONNAIRE - PHQ9
5. POOR APPETITE OR OVEREATING: SEVERAL DAYS
SUM OF ALL RESPONSES TO PHQ QUESTIONS 1-9: 5

## 2021-01-13 NOTE — PROGRESS NOTES
Doing well.   Doesn't want flu shot for now, discussed.   Plans to get COVID vaccine when eligible, discussed.   Good fetal movement, but not really consistent yet.  Fetal survey was normal.   Doing well on zoloft. Thinks a therapist would be helpful. Discussed Patricia Vail.   Discussed breast pumps and rx given.  RTC 4 weeks.

## 2021-01-14 ASSESSMENT — ANXIETY QUESTIONNAIRES: GAD7 TOTAL SCORE: 8

## 2021-02-01 ENCOUNTER — PRENATAL OFFICE VISIT (OUTPATIENT)
Dept: OBGYN | Facility: CLINIC | Age: 36
End: 2021-02-01
Payer: COMMERCIAL

## 2021-02-01 ENCOUNTER — MYC MEDICAL ADVICE (OUTPATIENT)
Dept: OBGYN | Facility: CLINIC | Age: 36
End: 2021-02-01

## 2021-02-01 VITALS
DIASTOLIC BLOOD PRESSURE: 65 MMHG | HEART RATE: 82 BPM | BODY MASS INDEX: 27.82 KG/M2 | WEIGHT: 188.4 LBS | SYSTOLIC BLOOD PRESSURE: 110 MMHG | TEMPERATURE: 97.8 F

## 2021-02-01 DIAGNOSIS — O09.522 MULTIGRAVIDA OF ADVANCED MATERNAL AGE IN SECOND TRIMESTER: Primary | ICD-10-CM

## 2021-02-01 LAB
GLUCOSE 1H P 50 G GLC PO SERPL-MCNC: 114 MG/DL (ref 60–129)
HGB BLD-MCNC: 12.2 G/DL (ref 11.7–15.7)

## 2021-02-01 PROCEDURE — 36415 COLL VENOUS BLD VENIPUNCTURE: CPT | Performed by: OBSTETRICS & GYNECOLOGY

## 2021-02-01 PROCEDURE — 99N1025 PR STATISTIC OBHBG - HEMOGLOBIN: Performed by: OBSTETRICS & GYNECOLOGY

## 2021-02-01 PROCEDURE — 82950 GLUCOSE TEST: CPT | Performed by: OBSTETRICS & GYNECOLOGY

## 2021-02-01 PROCEDURE — 99207 PR PRENATAL VISIT: CPT | Performed by: OBSTETRICS & GYNECOLOGY

## 2021-02-01 ASSESSMENT — ANXIETY QUESTIONNAIRES
3. WORRYING TOO MUCH ABOUT DIFFERENT THINGS: SEVERAL DAYS
GAD7 TOTAL SCORE: 5
1. FEELING NERVOUS, ANXIOUS, OR ON EDGE: SEVERAL DAYS
5. BEING SO RESTLESS THAT IT IS HARD TO SIT STILL: SEVERAL DAYS
7. FEELING AFRAID AS IF SOMETHING AWFUL MIGHT HAPPEN: NOT AT ALL
4. TROUBLE RELAXING: SEVERAL DAYS
2. NOT BEING ABLE TO STOP OR CONTROL WORRYING: NOT AT ALL
IF YOU CHECKED OFF ANY PROBLEMS ON THIS QUESTIONNAIRE, HOW DIFFICULT HAVE THESE PROBLEMS MADE IT FOR YOU TO DO YOUR WORK, TAKE CARE OF THINGS AT HOME, OR GET ALONG WITH OTHER PEOPLE: SOMEWHAT DIFFICULT
6. BECOMING EASILY ANNOYED OR IRRITABLE: SEVERAL DAYS

## 2021-02-01 ASSESSMENT — PATIENT HEALTH QUESTIONNAIRE - PHQ9: SUM OF ALL RESPONSES TO PHQ QUESTIONS 1-9: 4

## 2021-02-01 NOTE — PROGRESS NOTES
Childbirth classes? NO  Plan on breastfeeding? Yes: got her breast pump.  Birthcontrol? vasectomy  Sex on ultrasound? boy  Circumsion? Yes, probably. Discussed outpatient.  Peds doc? Judith Lowery.    Good fetal movement.   GCT today.  On the list for COVID vaccine.   RTC 4 weeks.

## 2021-02-02 ASSESSMENT — ANXIETY QUESTIONNAIRES: GAD7 TOTAL SCORE: 5

## 2021-03-03 ENCOUNTER — PRENATAL OFFICE VISIT (OUTPATIENT)
Dept: OBGYN | Facility: CLINIC | Age: 36
End: 2021-03-03
Payer: COMMERCIAL

## 2021-03-03 VITALS
TEMPERATURE: 97.5 F | HEART RATE: 95 BPM | SYSTOLIC BLOOD PRESSURE: 109 MMHG | WEIGHT: 194 LBS | BODY MASS INDEX: 28.65 KG/M2 | DIASTOLIC BLOOD PRESSURE: 67 MMHG

## 2021-03-03 DIAGNOSIS — Z34.83 ENCOUNTER FOR SUPERVISION OF OTHER NORMAL PREGNANCY IN THIRD TRIMESTER: Primary | ICD-10-CM

## 2021-03-03 PROCEDURE — 99207 PR PRENATAL VISIT: CPT | Performed by: OBSTETRICS & GYNECOLOGY

## 2021-03-03 NOTE — PROGRESS NOTES
Doing well.   Doing prenatal yoga and feels it is helping.   Good fetal movement.   Holding TDaP for now as anticipating COVID vaccine soon at work.  RTC 3-4 weeks

## 2021-03-31 ENCOUNTER — PRENATAL OFFICE VISIT (OUTPATIENT)
Dept: OBGYN | Facility: CLINIC | Age: 36
End: 2021-03-31
Payer: COMMERCIAL

## 2021-03-31 VITALS
BODY MASS INDEX: 29.24 KG/M2 | DIASTOLIC BLOOD PRESSURE: 63 MMHG | HEART RATE: 74 BPM | WEIGHT: 198 LBS | SYSTOLIC BLOOD PRESSURE: 106 MMHG

## 2021-03-31 DIAGNOSIS — Z34.83 ENCOUNTER FOR SUPERVISION OF OTHER NORMAL PREGNANCY IN THIRD TRIMESTER: Primary | ICD-10-CM

## 2021-03-31 PROCEDURE — 99207 PR PRENATAL VISIT: CPT | Performed by: OBSTETRICS & GYNECOLOGY

## 2021-03-31 NOTE — PROGRESS NOTES
Doing well.   Good fetal movement.   Hard to sleep sometimes.   Still trying to get COVID vaccine but hasn't been able to yet. Discussed eligible through [facility] now - will try to schedule. Will hold TDaP for now.  RTC 2 weeks

## 2021-04-15 ENCOUNTER — PRENATAL OFFICE VISIT (OUTPATIENT)
Dept: OBGYN | Facility: CLINIC | Age: 36
End: 2021-04-15
Payer: COMMERCIAL

## 2021-04-15 VITALS
DIASTOLIC BLOOD PRESSURE: 72 MMHG | HEART RATE: 95 BPM | BODY MASS INDEX: 29.62 KG/M2 | TEMPERATURE: 97.8 F | SYSTOLIC BLOOD PRESSURE: 112 MMHG | WEIGHT: 200.6 LBS

## 2021-04-15 DIAGNOSIS — Z23 NEED FOR TDAP VACCINATION: Primary | ICD-10-CM

## 2021-04-15 PROCEDURE — 90715 TDAP VACCINE 7 YRS/> IM: CPT | Performed by: OBSTETRICS & GYNECOLOGY

## 2021-04-15 PROCEDURE — 99207 PR PRENATAL VISIT: CPT | Performed by: OBSTETRICS & GYNECOLOGY

## 2021-04-15 PROCEDURE — 90471 IMMUNIZATION ADMIN: CPT | Performed by: OBSTETRICS & GYNECOLOGY

## 2021-04-15 NOTE — PROGRESS NOTES
Doing ok.   Having insomnia. Discussed techniques.  TDaP today.   Good fetal movement.   RTC 2 weeks. HGB, GBS then.

## 2021-04-17 ENCOUNTER — HEALTH MAINTENANCE LETTER (OUTPATIENT)
Age: 36
End: 2021-04-17

## 2021-04-27 ENCOUNTER — PRENATAL OFFICE VISIT (OUTPATIENT)
Dept: OBGYN | Facility: CLINIC | Age: 36
End: 2021-04-27
Payer: COMMERCIAL

## 2021-04-27 VITALS
HEIGHT: 69 IN | SYSTOLIC BLOOD PRESSURE: 100 MMHG | TEMPERATURE: 97.3 F | BODY MASS INDEX: 29.56 KG/M2 | DIASTOLIC BLOOD PRESSURE: 61 MMHG | HEART RATE: 77 BPM | WEIGHT: 199.6 LBS

## 2021-04-27 DIAGNOSIS — Z34.83 ENCOUNTER FOR SUPERVISION OF OTHER NORMAL PREGNANCY IN THIRD TRIMESTER: Primary | ICD-10-CM

## 2021-04-27 DIAGNOSIS — U07.1 COVID-19 AFFECTING PREGNANCY IN THIRD TRIMESTER: ICD-10-CM

## 2021-04-27 DIAGNOSIS — O98.513 COVID-19 AFFECTING PREGNANCY IN THIRD TRIMESTER: ICD-10-CM

## 2021-04-27 LAB
ERYTHROCYTE [DISTWIDTH] IN BLOOD BY AUTOMATED COUNT: 13 % (ref 10–15)
HCT VFR BLD AUTO: 36.5 % (ref 35–47)
HGB BLD-MCNC: 11.7 G/DL (ref 11.7–15.7)
MCH RBC QN AUTO: 28.3 PG (ref 26.5–33)
MCHC RBC AUTO-ENTMCNC: 32.1 G/DL (ref 31.5–36.5)
MCV RBC AUTO: 88 FL (ref 78–100)
PLATELET # BLD AUTO: 298 10E9/L (ref 150–450)
RBC # BLD AUTO: 4.14 10E12/L (ref 3.8–5.2)
WBC # BLD AUTO: 9 10E9/L (ref 4–11)

## 2021-04-27 PROCEDURE — 87653 STREP B DNA AMP PROBE: CPT | Performed by: OBSTETRICS & GYNECOLOGY

## 2021-04-27 PROCEDURE — 85027 COMPLETE CBC AUTOMATED: CPT | Performed by: OBSTETRICS & GYNECOLOGY

## 2021-04-27 PROCEDURE — 36415 COLL VENOUS BLD VENIPUNCTURE: CPT | Performed by: OBSTETRICS & GYNECOLOGY

## 2021-04-27 PROCEDURE — 99207 PR PRENATAL VISIT: CPT | Performed by: OBSTETRICS & GYNECOLOGY

## 2021-04-27 ASSESSMENT — MIFFLIN-ST. JEOR: SCORE: 1659.76

## 2021-04-27 ASSESSMENT — ANXIETY QUESTIONNAIRES
GAD7 TOTAL SCORE: 2
5. BEING SO RESTLESS THAT IT IS HARD TO SIT STILL: NOT AT ALL
1. FEELING NERVOUS, ANXIOUS, OR ON EDGE: NOT AT ALL
6. BECOMING EASILY ANNOYED OR IRRITABLE: SEVERAL DAYS
2. NOT BEING ABLE TO STOP OR CONTROL WORRYING: NOT AT ALL
7. FEELING AFRAID AS IF SOMETHING AWFUL MIGHT HAPPEN: NOT AT ALL
3. WORRYING TOO MUCH ABOUT DIFFERENT THINGS: SEVERAL DAYS
IF YOU CHECKED OFF ANY PROBLEMS ON THIS QUESTIONNAIRE, HOW DIFFICULT HAVE THESE PROBLEMS MADE IT FOR YOU TO DO YOUR WORK, TAKE CARE OF THINGS AT HOME, OR GET ALONG WITH OTHER PEOPLE: NOT DIFFICULT AT ALL

## 2021-04-27 ASSESSMENT — PATIENT HEALTH QUESTIONNAIRE - PHQ9
5. POOR APPETITE OR OVEREATING: NOT AT ALL
SUM OF ALL RESPONSES TO PHQ QUESTIONS 1-9: 5

## 2021-04-27 NOTE — PROGRESS NOTES
Doing ok.   Tested positive for COVID on 4/19, symptoms started 4/16. Had fatigue, cough. No other family members tested positive.   Will get growth ultrasound. Will do CBC instead of hgb today.  Discussed continued symptom monitoring though quarantine is over.   Discussed 6 weeks anticoagulation if labor within 2 weeks of diagnosis.   RTC weekly. GBS today.

## 2021-04-28 LAB
GP B STREP DNA SPEC QL NAA+PROBE: NEGATIVE
SPECIMEN SOURCE: NORMAL

## 2021-04-28 ASSESSMENT — ANXIETY QUESTIONNAIRES: GAD7 TOTAL SCORE: 2

## 2021-05-05 ENCOUNTER — HOSPITAL ENCOUNTER (INPATIENT)
Facility: CLINIC | Age: 36
LOS: 2 days | Discharge: HOME | End: 2021-05-07
Attending: OBSTETRICS & GYNECOLOGY | Admitting: OBSTETRICS & GYNECOLOGY
Payer: COMMERCIAL

## 2021-05-05 ENCOUNTER — ANCILLARY PROCEDURE (OUTPATIENT)
Dept: ULTRASOUND IMAGING | Facility: CLINIC | Age: 36
End: 2021-05-05
Attending: OBSTETRICS & GYNECOLOGY
Payer: COMMERCIAL

## 2021-05-05 DIAGNOSIS — U07.1 COVID-19 AFFECTING PREGNANCY IN THIRD TRIMESTER: ICD-10-CM

## 2021-05-05 DIAGNOSIS — O98.513 COVID-19 AFFECTING PREGNANCY IN THIRD TRIMESTER: ICD-10-CM

## 2021-05-05 PROBLEM — O41.00X0 OLIGOHYDRAMNIOS: Status: ACTIVE | Noted: 2021-05-05

## 2021-05-05 PROBLEM — Z36.89 ENCOUNTER FOR TRIAGE IN PREGNANT PATIENT: Status: ACTIVE | Noted: 2021-05-05

## 2021-05-05 LAB
ABO + RH BLD: NORMAL
ABO + RH BLD: NORMAL
BLD GP AB SCN SERPL QL: NORMAL
BLOOD BANK CMNT PATIENT-IMP: NORMAL
ERYTHROCYTE [DISTWIDTH] IN BLOOD BY AUTOMATED COUNT: 13.3 % (ref 10–15)
HCT VFR BLD AUTO: 35.2 % (ref 35–47)
HGB BLD-MCNC: 11.2 G/DL (ref 11.7–15.7)
MCH RBC QN AUTO: 28.5 PG (ref 26.5–33)
MCHC RBC AUTO-ENTMCNC: 31.8 G/DL (ref 31.5–36.5)
MCV RBC AUTO: 90 FL (ref 78–100)
PLATELET # BLD AUTO: 290 10E9/L (ref 150–450)
RBC # BLD AUTO: 3.93 10E12/L (ref 3.8–5.2)
RUPTURE OF FETAL MEMBRANES BY ROM PLUS: NEGATIVE
SPECIMEN EXP DATE BLD: NORMAL
WBC # BLD AUTO: 8.2 10E9/L (ref 4–11)

## 2021-05-05 PROCEDURE — 86901 BLOOD TYPING SEROLOGIC RH(D): CPT | Performed by: STUDENT IN AN ORGANIZED HEALTH CARE EDUCATION/TRAINING PROGRAM

## 2021-05-05 PROCEDURE — 84112 EVAL AMNIOTIC FLUID PROTEIN: CPT | Performed by: OBSTETRICS & GYNECOLOGY

## 2021-05-05 PROCEDURE — 3E033VJ INTRODUCTION OF OTHER HORMONE INTO PERIPHERAL VEIN, PERCUTANEOUS APPROACH: ICD-10-PCS | Performed by: OBSTETRICS & GYNECOLOGY

## 2021-05-05 PROCEDURE — 76816 OB US FOLLOW-UP PER FETUS: CPT | Mod: 26 | Performed by: OBSTETRICS & GYNECOLOGY

## 2021-05-05 PROCEDURE — 86850 RBC ANTIBODY SCREEN: CPT | Performed by: STUDENT IN AN ORGANIZED HEALTH CARE EDUCATION/TRAINING PROGRAM

## 2021-05-05 PROCEDURE — 85027 COMPLETE CBC AUTOMATED: CPT | Performed by: STUDENT IN AN ORGANIZED HEALTH CARE EDUCATION/TRAINING PROGRAM

## 2021-05-05 PROCEDURE — 250N000009 HC RX 250: Performed by: STUDENT IN AN ORGANIZED HEALTH CARE EDUCATION/TRAINING PROGRAM

## 2021-05-05 PROCEDURE — 120N000002 HC R&B MED SURG/OB UMMC

## 2021-05-05 PROCEDURE — 36415 COLL VENOUS BLD VENIPUNCTURE: CPT | Performed by: STUDENT IN AN ORGANIZED HEALTH CARE EDUCATION/TRAINING PROGRAM

## 2021-05-05 PROCEDURE — 86780 TREPONEMA PALLIDUM: CPT | Performed by: STUDENT IN AN ORGANIZED HEALTH CARE EDUCATION/TRAINING PROGRAM

## 2021-05-05 PROCEDURE — 86900 BLOOD TYPING SEROLOGIC ABO: CPT | Performed by: STUDENT IN AN ORGANIZED HEALTH CARE EDUCATION/TRAINING PROGRAM

## 2021-05-05 PROCEDURE — 76816 OB US FOLLOW-UP PER FETUS: CPT

## 2021-05-05 PROCEDURE — 258N000003 HC RX IP 258 OP 636: Performed by: STUDENT IN AN ORGANIZED HEALTH CARE EDUCATION/TRAINING PROGRAM

## 2021-05-05 RX ORDER — FENTANYL CITRATE 50 UG/ML
50-100 INJECTION, SOLUTION INTRAMUSCULAR; INTRAVENOUS
Status: DISCONTINUED | OUTPATIENT
Start: 2021-05-05 | End: 2021-05-06

## 2021-05-05 RX ORDER — CITRIC ACID/SODIUM CITRATE 334-500MG
30 SOLUTION, ORAL ORAL ONCE
Status: DISCONTINUED | OUTPATIENT
Start: 2021-05-05 | End: 2021-05-06

## 2021-05-05 RX ORDER — NALOXONE HYDROCHLORIDE 0.4 MG/ML
0.2 INJECTION, SOLUTION INTRAMUSCULAR; INTRAVENOUS; SUBCUTANEOUS
Status: DISCONTINUED | OUTPATIENT
Start: 2021-05-05 | End: 2021-05-06

## 2021-05-05 RX ORDER — PROCHLORPERAZINE 25 MG
25 SUPPOSITORY, RECTAL RECTAL EVERY 12 HOURS PRN
Status: DISCONTINUED | OUTPATIENT
Start: 2021-05-05 | End: 2021-05-06

## 2021-05-05 RX ORDER — ONDANSETRON 2 MG/ML
4 INJECTION INTRAMUSCULAR; INTRAVENOUS EVERY 6 HOURS PRN
Status: DISCONTINUED | OUTPATIENT
Start: 2021-05-05 | End: 2021-05-06

## 2021-05-05 RX ORDER — METHYLERGONOVINE MALEATE 0.2 MG/ML
200 INJECTION INTRAVENOUS
Status: DISCONTINUED | OUTPATIENT
Start: 2021-05-05 | End: 2021-05-06

## 2021-05-05 RX ORDER — ACETAMINOPHEN 325 MG/1
650 TABLET ORAL EVERY 4 HOURS PRN
Status: DISCONTINUED | OUTPATIENT
Start: 2021-05-05 | End: 2021-05-06

## 2021-05-05 RX ORDER — IBUPROFEN 800 MG/1
800 TABLET, FILM COATED ORAL
Status: COMPLETED | OUTPATIENT
Start: 2021-05-05 | End: 2021-05-06

## 2021-05-05 RX ORDER — SODIUM CHLORIDE, SODIUM LACTATE, POTASSIUM CHLORIDE, CALCIUM CHLORIDE 600; 310; 30; 20 MG/100ML; MG/100ML; MG/100ML; MG/100ML
INJECTION, SOLUTION INTRAVENOUS CONTINUOUS
Status: DISCONTINUED | OUTPATIENT
Start: 2021-05-05 | End: 2021-05-06

## 2021-05-05 RX ORDER — CARBOPROST TROMETHAMINE 250 UG/ML
250 INJECTION, SOLUTION INTRAMUSCULAR
Status: DISCONTINUED | OUTPATIENT
Start: 2021-05-05 | End: 2021-05-06

## 2021-05-05 RX ORDER — TRANEXAMIC ACID 10 MG/ML
1 INJECTION, SOLUTION INTRAVENOUS EVERY 30 MIN PRN
Status: DISCONTINUED | OUTPATIENT
Start: 2021-05-05 | End: 2021-05-06

## 2021-05-05 RX ORDER — LIDOCAINE 40 MG/G
CREAM TOPICAL
Status: DISCONTINUED | OUTPATIENT
Start: 2021-05-05 | End: 2021-05-06

## 2021-05-05 RX ORDER — METOCLOPRAMIDE HYDROCHLORIDE 5 MG/ML
10 INJECTION INTRAMUSCULAR; INTRAVENOUS EVERY 6 HOURS PRN
Status: DISCONTINUED | OUTPATIENT
Start: 2021-05-05 | End: 2021-05-06

## 2021-05-05 RX ORDER — NALOXONE HYDROCHLORIDE 0.4 MG/ML
0.4 INJECTION, SOLUTION INTRAMUSCULAR; INTRAVENOUS; SUBCUTANEOUS
Status: DISCONTINUED | OUTPATIENT
Start: 2021-05-05 | End: 2021-05-06

## 2021-05-05 RX ORDER — OXYTOCIN 10 [USP'U]/ML
10 INJECTION, SOLUTION INTRAMUSCULAR; INTRAVENOUS
Status: DISCONTINUED | OUTPATIENT
Start: 2021-05-05 | End: 2021-05-06

## 2021-05-05 RX ORDER — OXYCODONE AND ACETAMINOPHEN 5; 325 MG/1; MG/1
1 TABLET ORAL
Status: DISCONTINUED | OUTPATIENT
Start: 2021-05-05 | End: 2021-05-06

## 2021-05-05 RX ORDER — OXYTOCIN/0.9 % SODIUM CHLORIDE 30/500 ML
1-24 PLASTIC BAG, INJECTION (ML) INTRAVENOUS CONTINUOUS
Status: DISCONTINUED | OUTPATIENT
Start: 2021-05-05 | End: 2021-05-06

## 2021-05-05 RX ORDER — SERTRALINE HYDROCHLORIDE 25 MG/1
25 TABLET, FILM COATED ORAL DAILY
Status: DISCONTINUED | OUTPATIENT
Start: 2021-05-06 | End: 2021-05-07 | Stop reason: HOSPADM

## 2021-05-05 RX ORDER — OXYTOCIN/0.9 % SODIUM CHLORIDE 30/500 ML
100-340 PLASTIC BAG, INJECTION (ML) INTRAVENOUS CONTINUOUS PRN
Status: DISCONTINUED | OUTPATIENT
Start: 2021-05-05 | End: 2021-05-06

## 2021-05-05 RX ADMIN — Medication 2 MILLI-UNITS/MIN: at 20:14

## 2021-05-05 RX ADMIN — SODIUM CHLORIDE, POTASSIUM CHLORIDE, SODIUM LACTATE AND CALCIUM CHLORIDE: 600; 310; 30; 20 INJECTION, SOLUTION INTRAVENOUS at 20:10

## 2021-05-05 NOTE — H&P
"L&D History and Physical   May 5, 2021  Oanh Interiano  [id number]    HPI:   Oanh Interiano is a 35 year old  at 37w6d by LMP c/w 7wUS who presents following prenatal US showing oligohydramnios with MVP 2 cm. Pregnancy largely uncomplicated except for history of COVID infection and MDD/BILL.    She has been feeling nauseous and dizzy for two days. She endorses a gush of clear fluid this occurred this morning. Discharge was watery with some mucous. Good FM. Occasional contractions that feel crampy. She endorses mild headache, nausea, and brief episode of bright spots in her vision yesterday. She denies F/C, loss of taste/smell, cough, sore throat, CP, SOB, RUQ pain, vomiting, dysuria, constipation, diarrhea, increased edema.    She has had long labor courses with difficult deliveres per her report. First  at [place] following transfer from [facility] after cervix went from 9 -> 8cm in labor for 18 hours. She had PPH in her first delivery and required VAVD. Second  at [facility] with CNM group. Notes from her prior delivery state \"ineffective pusher.\" Both of her previous vaginal deliveries were babies over 9lbs. No history of shoulder dystocia.    Her pregnancy has been uncomplicated  - MDD/BILL - PTA Zoloft  - COVID-19 on   - Hx VAVD and PPH in first pregnancy    OBHX:   OB History    Para Term  AB Living   5 2 2 0 2 2   SAB TAB Ectopic Multiple Live Births   1 0 0 0 2      # Outcome Date GA Lbr Diogenes/2nd Weight Sex Delivery Anes PTL Lv   5 Current            4 AB 20 8w0d    AB, MISSED         Birth Comments: miso   3 Term 18 40w3d  4.167 kg (9 lb 3 oz) F    DANIEL   2 SAB 2018 6w0d    SAB         Birth Comments: no D&C   1 Term 12/28/15 41w0d  4.167 kg (9 lb 3 oz) F Vag-Vacuum None N DANIEL     Past Medical History:   Diagnosis Date     ASCUS with positive high risk HPV 3/2013    + HPV 59, 62     Papanicolaou smear of cervix with low grade squamous intraepithelial lesion (LGSIL) 2010 "    colp WNL     Past Surgical History:   Procedure Laterality Date     NO HISTORY OF SURGERY       Medications:   No current facility-administered medications on file prior to encounter.   Prenatal MV-Min-Fe Fum-FA-DHA (PRENATAL+DHA PO),   sertraline (ZOLOFT) 50 MG tablet, Take 1 tablet (50 mg) by mouth daily      Allergies   Allergen Reactions     Milk Protein Extract Nausea and Vomiting, Hives, Itching, Swelling and GI Disturbance     Itching, hives, swelling, GI distrubance     Onion GI Disturbance     Sulfa Drugs Hives       Family History   Problem Relation Age of Onset     Thyroid Disease Mother         hypothyroidism     Circulatory Mother      Depression Mother      Neurologic Disorder Mother      Psychotic Disorder Mother      Skin Cancer Mother      Thyroid Disease Sister      Depression Sister      Hypertension Father      Lipids Father      Diabetes Paternal Grandmother      Cerebrovascular Disease Paternal Grandmother      Alcohol/Drug Paternal Grandmother      Cerebrovascular Disease Paternal Grandfather      Alcohol/Drug Paternal Grandfather      Circulatory Paternal Grandfather      Eye Disorder Paternal Grandfather      Cancer - colorectal Maternal Grandfather      Allergies Sister      Abdominal Aortic Aneurysm Sister      Thyroid Disease Sister         thyroidectomy     Depression Sister      Alzheimer Disease Maternal Grandmother      SocialHX:   Social History     Tobacco Use     Smoking status: Never Smoker     Smokeless tobacco: Never Used   Substance Use Topics     Alcohol use: Not Currently     Comment: occ     Drug use: No      ROS: 10-point ROS negative except as indicated in HPI.    Physical Exam:  Vitals:    05/05/21 1520   BP: 100/64   Resp: 18   Temp: 98.1  F (36.7  C)   TempSrc: Oral     General: alert, oriented female, resting in bed in NAD  CV: regular rate and rhythm  Lungs: clear bilaterally, no crackles or wheezes  Abdomen: soft, gravid, non-tender, EFW 7.5#.  Extremities:  bilateral lower extremities non-tender with no edema    SVE: 3/50/-3, midposition, medium consistency    Presentation: Cephalic by BSUS    Fetal Heart Tracing:  Baseline: 155  Variability: Moderate  Accelerations: Present  Decelerations: Single 2 mintue spontaneous deceleartion to jose ramon 110s    Prenatal ultrasounds:  Formal ultrasound 21:  Presentation - cephalic   USEGA = 38 5/7 weeks.  EFW = 3,519 grams +/- 514g, 73% for 37 weeks.   Fetal anatomy visualized and appears normal.   EULA = oligohydramnios, MVP - 2.0,2.2cm.  FHR = 182, 158bpm .   Placenta anterior, no previa.      Prenatal Labs:   Lab Results   Component Value Date    ABO A 2021    RH Pos 2021    AS Neg 2021    HEPBANG Nonreactive 10/28/2020    RUBELLAABIGG 23 2013    HGB 11.2 (L) 2021     GBS Status:   Lab Results   Component Value Date    GBS Negative 2021     Lab Results   Component Value Date    PAP NIL 2018     Labs:   Results for orders placed or performed during the hospital encounter of 21 (from the past 24 hour(s))   Rupture of Fetal Membranes by ROM Plus   Result Value Ref Range    Rupture of Fetal Membranes by ROM Plus Negative NEG^Negative   ABO/Rh type and screen   Result Value Ref Range    ABO A     RH(D) Pos     Antibody Screen Neg     Test Valid Only At          [facility]    Specimen Expires 2021    CBC with platelets   Result Value Ref Range    WBC 8.2 4.0 - 11.0 10e9/L    RBC Count 3.93 3.8 - 5.2 10e12/L    Hemoglobin 11.2 (L) 11.7 - 15.7 g/dL    Hematocrit 35.2 35.0 - 47.0 %    MCV 90 78 - 100 fl    MCH 28.5 26.5 - 33.0 pg    MCHC 31.8 31.5 - 36.5 g/dL    RDW 13.3 10.0 - 15.0 %    Platelet Count 290 150 - 450 10e9/L     A/P:   Oanh Interiano is a 35 year old  at 37w6d by LMP c/w 7wUS who presents following prenatal US showing oligohydramnios with MVP 2 cm. Pregnancy largely uncomplicated except for history of COVID  infection and MDD/BILL. Despite negative ROM+ suspect PROM may have occurred given clinical history and US findings. She also a spontaneous 2-3min decel while in triage. Given the above and term pregnancy recommend she stay for IOL, and she and  agree.    #IOL  #?PROM  - Admit to labor and delivery for IOL  - Negative ROM Plus  - SVE: 3/50/-3. Will start with pitocin at this time given appropriate Bishops score  - Pain: plan for epidural. Aware of options  - GBS negative    #MDD/BILL  - PTA Zoloft 25 mg    #Hx 9lb babies  - No hx SD, though did discuss SD precautions    #Fetal Well Being  - Overall Cat 1 but notable for a 2-3min spontaneous decel  - Continuous EFM  - Interventions PRN  - EFW 7.5#    #PNC:     - Rh positive, Rubella immune, GCT wnl  - Hep B Antigen neg  - GBS neg  - Other infectious labs neg  - Placenta: anterior, no previa  - Immunization: due for TDAP  - Pap: UTD  - Feed: breastfeed  - BC: vasectomy    Patient seen and care plan discussed under supervision of Dr. Turcios.    Flaco Bettencourt MD  OB/GYN PGY-2  05/05/21 7:32 PM

## 2021-05-05 NOTE — PROVIDER NOTIFICATION
05/05/21 1535   Provider Notification   Provider Name/Title Dr. Gomes   Method of Notification Phone   Request Evaluate in Person   Notification Reason Patient Arrived

## 2021-05-05 NOTE — PLAN OF CARE
37.6 week patient of [facility] MDs here from clinic for further evaluation due to low fluid noted on ultrasound. Denies continued leaking of fluid (following a gush this morning), vaginal bleeding, or regular contractions. Confirms fetal movement. EFM applied with verbal consent for fetal assessment. Dr. Gomes notified of arrival.

## 2021-05-05 NOTE — PLAN OF CARE
"Pt had a light headed response to IV insertion. She felt she would \"black-out\". Pt placed supine and encouraged to breathe deeply. Episode passed. Pt offered po hydration and cool cloth to head for relief.  "

## 2021-05-06 ENCOUNTER — ANESTHESIA EVENT (OUTPATIENT)
Dept: OBGYN | Facility: CLINIC | Age: 36
End: 2021-05-06
Payer: COMMERCIAL

## 2021-05-06 ENCOUNTER — ANESTHESIA (OUTPATIENT)
Dept: OBGYN | Facility: CLINIC | Age: 36
End: 2021-05-06
Payer: COMMERCIAL

## 2021-05-06 LAB — T PALLIDUM AB SER QL: NONREACTIVE

## 2021-05-06 PROCEDURE — 120N000002 HC R&B MED SURG/OB UMMC

## 2021-05-06 PROCEDURE — 00HU33Z INSERTION OF INFUSION DEVICE INTO SPINAL CANAL, PERCUTANEOUS APPROACH: ICD-10-PCS | Performed by: ANESTHESIOLOGY

## 2021-05-06 PROCEDURE — 3E0R3BZ INTRODUCTION OF ANESTHETIC AGENT INTO SPINAL CANAL, PERCUTANEOUS APPROACH: ICD-10-PCS | Performed by: ANESTHESIOLOGY

## 2021-05-06 PROCEDURE — 370N000003 HC ANESTHESIA WARD SERVICE

## 2021-05-06 PROCEDURE — 59400 OBSTETRICAL CARE: CPT | Mod: GC | Performed by: OBSTETRICS & GYNECOLOGY

## 2021-05-06 PROCEDURE — 10907ZC DRAINAGE OF AMNIOTIC FLUID, THERAPEUTIC FROM PRODUCTS OF CONCEPTION, VIA NATURAL OR ARTIFICIAL OPENING: ICD-10-PCS | Performed by: OBSTETRICS & GYNECOLOGY

## 2021-05-06 PROCEDURE — 258N000003 HC RX IP 258 OP 636: Performed by: STUDENT IN AN ORGANIZED HEALTH CARE EDUCATION/TRAINING PROGRAM

## 2021-05-06 PROCEDURE — 250N000009 HC RX 250: Performed by: ANESTHESIOLOGY

## 2021-05-06 PROCEDURE — 250N000013 HC RX MED GY IP 250 OP 250 PS 637: Performed by: STUDENT IN AN ORGANIZED HEALTH CARE EDUCATION/TRAINING PROGRAM

## 2021-05-06 PROCEDURE — 722N000001 HC LABOR CARE VAGINAL DELIVERY SINGLE

## 2021-05-06 PROCEDURE — 250N000009 HC RX 250: Performed by: STUDENT IN AN ORGANIZED HEALTH CARE EDUCATION/TRAINING PROGRAM

## 2021-05-06 PROCEDURE — 250N000009 HC RX 250

## 2021-05-06 PROCEDURE — 250N000011 HC RX IP 250 OP 636: Performed by: ANESTHESIOLOGY

## 2021-05-06 PROCEDURE — 250N000011 HC RX IP 250 OP 636: Performed by: STUDENT IN AN ORGANIZED HEALTH CARE EDUCATION/TRAINING PROGRAM

## 2021-05-06 PROCEDURE — 0HQ9XZZ REPAIR PERINEUM SKIN, EXTERNAL APPROACH: ICD-10-PCS | Performed by: OBSTETRICS & GYNECOLOGY

## 2021-05-06 RX ORDER — EPHEDRINE SULFATE 50 MG/ML
5 INJECTION, SOLUTION INTRAMUSCULAR; INTRAVENOUS; SUBCUTANEOUS
Status: DISCONTINUED | OUTPATIENT
Start: 2021-05-06 | End: 2021-05-06

## 2021-05-06 RX ORDER — LIDOCAINE HYDROCHLORIDE 10 MG/ML
INJECTION, SOLUTION EPIDURAL; INFILTRATION; INTRACAUDAL; PERINEURAL
Status: COMPLETED
Start: 2021-05-06 | End: 2021-05-06

## 2021-05-06 RX ORDER — LIDOCAINE HYDROCHLORIDE AND EPINEPHRINE 15; 5 MG/ML; UG/ML
INJECTION, SOLUTION EPIDURAL PRN
Status: DISCONTINUED | OUTPATIENT
Start: 2021-05-06 | End: 2021-05-06

## 2021-05-06 RX ORDER — OXYTOCIN 10 [USP'U]/ML
10 INJECTION, SOLUTION INTRAMUSCULAR; INTRAVENOUS
Status: DISCONTINUED | OUTPATIENT
Start: 2021-05-06 | End: 2021-05-07 | Stop reason: HOSPADM

## 2021-05-06 RX ORDER — OXYTOCIN/0.9 % SODIUM CHLORIDE 30/500 ML
340 PLASTIC BAG, INJECTION (ML) INTRAVENOUS CONTINUOUS PRN
Status: DISCONTINUED | OUTPATIENT
Start: 2021-05-06 | End: 2021-05-07 | Stop reason: HOSPADM

## 2021-05-06 RX ORDER — IBUPROFEN 600 MG/1
600 TABLET, FILM COATED ORAL EVERY 6 HOURS PRN
Qty: 60 TABLET | Refills: 0 | Status: SHIPPED | OUTPATIENT
Start: 2021-05-06 | End: 2021-06-15

## 2021-05-06 RX ORDER — OXYTOCIN/0.9 % SODIUM CHLORIDE 30/500 ML
100 PLASTIC BAG, INJECTION (ML) INTRAVENOUS CONTINUOUS
Status: DISCONTINUED | OUTPATIENT
Start: 2021-05-06 | End: 2021-05-07 | Stop reason: HOSPADM

## 2021-05-06 RX ORDER — IBUPROFEN 800 MG/1
800 TABLET, FILM COATED ORAL EVERY 6 HOURS PRN
Status: DISCONTINUED | OUTPATIENT
Start: 2021-05-06 | End: 2021-05-07 | Stop reason: HOSPADM

## 2021-05-06 RX ORDER — FENTANYL CITRATE 50 UG/ML
INJECTION, SOLUTION INTRAMUSCULAR; INTRAVENOUS
Status: COMPLETED | OUTPATIENT
Start: 2021-05-06 | End: 2021-05-06

## 2021-05-06 RX ORDER — AMOXICILLIN 250 MG
1 CAPSULE ORAL DAILY
Qty: 100 TABLET | Refills: 0 | Status: SHIPPED | OUTPATIENT
Start: 2021-05-06 | End: 2021-06-15

## 2021-05-06 RX ORDER — OXYTOCIN 10 [USP'U]/ML
INJECTION, SOLUTION INTRAMUSCULAR; INTRAVENOUS
Status: DISCONTINUED
Start: 2021-05-06 | End: 2021-05-06 | Stop reason: WASHOUT

## 2021-05-06 RX ORDER — HYDROCORTISONE 2.5 %
CREAM (GRAM) TOPICAL 3 TIMES DAILY PRN
Status: DISCONTINUED | OUTPATIENT
Start: 2021-05-06 | End: 2021-05-07 | Stop reason: HOSPADM

## 2021-05-06 RX ORDER — AMOXICILLIN 250 MG
1 CAPSULE ORAL 2 TIMES DAILY
Status: DISCONTINUED | OUTPATIENT
Start: 2021-05-06 | End: 2021-05-07 | Stop reason: HOSPADM

## 2021-05-06 RX ORDER — METHYLERGONOVINE MALEATE 0.2 MG/ML
200 INJECTION INTRAVENOUS
Status: DISCONTINUED | OUTPATIENT
Start: 2021-05-06 | End: 2021-05-07 | Stop reason: HOSPADM

## 2021-05-06 RX ORDER — MISOPROSTOL 200 UG/1
TABLET ORAL
Status: DISCONTINUED
Start: 2021-05-06 | End: 2021-05-06 | Stop reason: HOSPADM

## 2021-05-06 RX ORDER — ACETAMINOPHEN 325 MG/1
650 TABLET ORAL EVERY 4 HOURS PRN
Status: DISCONTINUED | OUTPATIENT
Start: 2021-05-06 | End: 2021-05-07 | Stop reason: HOSPADM

## 2021-05-06 RX ORDER — AMOXICILLIN 250 MG
2 CAPSULE ORAL 2 TIMES DAILY
Status: DISCONTINUED | OUTPATIENT
Start: 2021-05-06 | End: 2021-05-07 | Stop reason: HOSPADM

## 2021-05-06 RX ORDER — MODIFIED LANOLIN
OINTMENT (GRAM) TOPICAL
Status: DISCONTINUED | OUTPATIENT
Start: 2021-05-06 | End: 2021-05-07 | Stop reason: HOSPADM

## 2021-05-06 RX ORDER — OXYTOCIN/0.9 % SODIUM CHLORIDE 30/500 ML
PLASTIC BAG, INJECTION (ML) INTRAVENOUS
Status: DISCONTINUED
Start: 2021-05-06 | End: 2021-05-06 | Stop reason: HOSPADM

## 2021-05-06 RX ORDER — TRANEXAMIC ACID 10 MG/ML
1 INJECTION, SOLUTION INTRAVENOUS EVERY 30 MIN PRN
Status: DISCONTINUED | OUTPATIENT
Start: 2021-05-06 | End: 2021-05-07 | Stop reason: HOSPADM

## 2021-05-06 RX ORDER — NALBUPHINE HYDROCHLORIDE 10 MG/ML
2.5-5 INJECTION, SOLUTION INTRAMUSCULAR; INTRAVENOUS; SUBCUTANEOUS EVERY 6 HOURS PRN
Status: DISCONTINUED | OUTPATIENT
Start: 2021-05-06 | End: 2021-05-06

## 2021-05-06 RX ORDER — BISACODYL 10 MG
10 SUPPOSITORY, RECTAL RECTAL DAILY PRN
Status: DISCONTINUED | OUTPATIENT
Start: 2021-05-08 | End: 2021-05-07 | Stop reason: HOSPADM

## 2021-05-06 RX ORDER — CARBOPROST TROMETHAMINE 250 UG/ML
250 INJECTION, SOLUTION INTRAMUSCULAR
Status: DISCONTINUED | OUTPATIENT
Start: 2021-05-06 | End: 2021-05-07 | Stop reason: HOSPADM

## 2021-05-06 RX ORDER — ACETAMINOPHEN 325 MG/1
650 TABLET ORAL EVERY 6 HOURS PRN
Qty: 100 TABLET | Refills: 0 | Status: SHIPPED | OUTPATIENT
Start: 2021-05-06 | End: 2021-06-15

## 2021-05-06 RX ADMIN — ACETAMINOPHEN 650 MG: 325 TABLET, FILM COATED ORAL at 16:42

## 2021-05-06 RX ADMIN — SODIUM CHLORIDE, POTASSIUM CHLORIDE, SODIUM LACTATE AND CALCIUM CHLORIDE: 600; 310; 30; 20 INJECTION, SOLUTION INTRAVENOUS at 04:11

## 2021-05-06 RX ADMIN — FENTANYL CITRATE 16 MCG: 50 INJECTION, SOLUTION INTRAMUSCULAR; INTRAVENOUS at 10:05

## 2021-05-06 RX ADMIN — DOCUSATE SODIUM 50MG AND SENNOSIDES 8.6MG 1 TABLET: 8.6; 5 TABLET, FILM COATED ORAL at 20:41

## 2021-05-06 RX ADMIN — SERTRALINE HYDROCHLORIDE 25 MG: 25 TABLET ORAL at 08:10

## 2021-05-06 RX ADMIN — ACETAMINOPHEN 650 MG: 325 TABLET, FILM COATED ORAL at 20:41

## 2021-05-06 RX ADMIN — LIDOCAINE HYDROCHLORIDE 10 ML: 10 INJECTION, SOLUTION EPIDURAL; INFILTRATION; INTRACAUDAL; PERINEURAL at 11:40

## 2021-05-06 RX ADMIN — IBUPROFEN 800 MG: 800 TABLET, FILM COATED ORAL at 12:39

## 2021-05-06 RX ADMIN — Medication 8 ML: at 10:05

## 2021-05-06 RX ADMIN — Medication 10 ML/HR: at 10:06

## 2021-05-06 RX ADMIN — IBUPROFEN 800 MG: 800 TABLET, FILM COATED ORAL at 18:57

## 2021-05-06 RX ADMIN — Medication 10 ML/HR: at 10:09

## 2021-05-06 RX ADMIN — LIDOCAINE HYDROCHLORIDE,EPINEPHRINE BITARTRATE 3 ML: 15; .005 INJECTION, SOLUTION EPIDURAL; INFILTRATION; INTRACAUDAL; PERINEURAL at 09:58

## 2021-05-06 RX ADMIN — Medication 5 MG: at 10:16

## 2021-05-06 NOTE — PLAN OF CARE
Pt is an IOL for oligohydramnios. VSS. Pitocin started for induction at 2014 and titrated up to 4 mu/min. Patient faisal every 1-4 minutes, mild to palpation, soft resting tone. FHR baseline 145-155, moderate variability, accels present, rare late decel. Patient reports mild lower uterine cramping pain, declining pain medication at this time. Patient using birthing ball and breathing exercises. Supportive  at bedside.

## 2021-05-06 NOTE — DISCHARGE SUMMARY
"[facility] Discharge Summary    Oanh Interiano MRN# [id number]   Age: 35 year old YOB: 1985     Date of Admission:  2021  Date of Discharge:  2021  Admitting Physician:  Damaris Turcios MD  Discharge Physician:  Maddie Arteaga MD    Admit Dx:   - Intrauterine pregnancy at 38w0d   - Oligohydramnios    Discharge Dx:  - Same as above, s/p     Procedures:  - Spontaneous vaginal delivery  - AROM  - Epidural analgesia    Admission History:  Oanh Interiano is a 35 year old  at 37w6d by LMP c/w 7wUS who presents following prenatal US showing oligohydramnios with MVP 2 cm. Pregnancy largely uncomplicated except for history of COVID infection and MDD/BILL. She has been feeling nauseous and dizzy for two days. She endorses a gush of clear fluid this occurred this morning. Discharge was watery with some mucous. Good FM. Occasional contractions that feel crampy. She endorses mild headache, nausea, and brief episode of bright spots in her vision yesterday. She denies F/C, loss of taste/smell, cough, sore throat, CP, SOB, RUQ pain, vomiting, dysuria, constipation, diarrhea, increased edema.      She has had long labor courses with difficult deliveres per her report. First  at [place] following transfer from [facility] after cervix went from 9 -> 8cm in labor for 18 hours. She had PPH in her first delivery and required VAVD. [facility]. Notes from her prior delivery state \"ineffective pusher.\" Both of her previous vaginal deliveries were babies over 9lbs. No history of shoulder dystocia.    Despite negative ROM+ suspect PROM may have occurred given clinical history and US findings. She also a spontaneous 2-3min decel while in triage. Given the above and term pregnancy recommend she stay for IOL, and she and  agree.    Please see her Admission H&P for further details.    Intrapartum Course:  Oanh Interiano is a 35 year old now  who " was admitted for IOL at 37w6d for oligohydramnios, suspected PROM though negative ROM+, and a spontaneous decel at term. Her pregnancy was largely uncomplicated except for history of COVID infection and MDD/BILL. On admit she was dilated 3cm. She was started on pitocin but had minimal change over 12 hours on pitocin. She then underwent AROM at 4-5cm and had an epidural placed. She rapidly progressed to completed in just over 2 hours. She pushed for 56 minutes after which she had a spontaneous vaginal delivery of a viable male infant in ANY position. No nuchal cord was noted.  Apgars of 9 and 9 with weight of 7lb 4.84 oz. The cord was double clamped after approximately 2 minutes, when pulsing stopped, and cut.  A cord segment and cord blood were obtained.  30U of IV pitocin was started. The placenta was then delivered using gentle traction and suprapubic pressure.  The uterus was noted to be firm after fundal massage.  The perineum was assessed for lacerations and a first degree perineal laceration was noted.  The laceration was repaired with a single stitch of 3-0 Vicryl. On final examination of the perineum, the repair was noted to be hemostatic.  Total QBL was 69.  The placenta appeared intact with a 3V umbilical cord.  Dr. Mendosa was present for the entire procedure.    Please see her Delivery Summary for further details.    Postpartum Course:  Her postpartum course was uncomplicated. On PPD#1, she was meeting all of her postpartum goals and deemed stable for discharge. She was voiding without difficulty, tolerating a regular diet without nausea and vomiting, her pain was well controlled on oral pain medicines and her lochia was appropriate. Her hemoglobin prior to delivery was 11.2 and after delivery was 10.6. Her Rh status was positive, and Rhogam was not indicated.     Discharge Medications:     Review of your medicines      START taking      Dose / Directions   acetaminophen 325 MG tablet  Commonly known  as: TYLENOL  Used for:  (normal spontaneous vaginal delivery)      Dose: 650 mg  Take 2 tablets (650 mg) by mouth every 6 hours as needed for mild pain Start after Delivery.  Quantity: 100 tablet  Refills: 0     ibuprofen 600 MG tablet  Commonly known as: ADVIL/MOTRIN  Used for:  (normal spontaneous vaginal delivery)      Dose: 600 mg  Take 1 tablet (600 mg) by mouth every 6 hours as needed for moderate pain Start after delivery  Quantity: 60 tablet  Refills: 0     senna-docusate 8.6-50 MG tablet  Commonly known as: SENOKOT-S/PERICOLACE  Used for:  (normal spontaneous vaginal delivery)      Dose: 1 tablet  Take 1 tablet by mouth daily Start after delivery.  Quantity: 100 tablet  Refills: 0        CONTINUE these medicines which have NOT CHANGED      Dose / Directions   PRENATAL+DHA PO      Refills: 0     sertraline 50 MG tablet  Commonly known as: ZOLOFT  Used for: Postpartum depression      Dose: 50 mg  Take 1 tablet (50 mg) by mouth daily  Quantity: 90 tablet  Refills: 3           Where to get your medicines      These medications were sent to [facility] [address]  [address] [address]    Phone: [phone]     acetaminophen 325 MG tablet    ibuprofen 600 MG tablet    senna-docusate 8.6-50 MG tablet       Discharge/Disposition:  Oanh Interiano was discharged to home in stable condition with the following instructions/medications:  1) Call for temperature > 100.4, bright red vaginal bleeding >1 pad an hour x 2 hours, foul smelling vaginal discharge, pain not controlled by usual oral pain meds, persistent nausea and vomiting not controlled on medications  2) She desired vasectomy for contraception.  3) For feeding she decided to breastfeed.  4) She was instructed to follow-up with her primary OB in 6 weeks for a routine postpartum visit and in 1 week for a mood check.    Staffed with Dr. Maddie Arteaga.    Deepak Gomes MD MPH  OB/Gyn  PGY-2  05/07/21 8:44 AM    Physician Attestation   I, Maddie Arteaga, saw and evaluated this patient prior to discharge.  I discussed the patient with the resident/fellow and agree with plan of care as documented in the note.      I personally reviewed vital signs, medications and labs.    I personally spent 10 minutes on discharge activities.    Maddie Arteaga MD  Date of Service (when I saw the patient): 05/07/21

## 2021-05-06 NOTE — ANESTHESIA PROCEDURE NOTES
Dural puncture epidural Procedure Note  Pre-Procedure   Staff -        Anesthesiologist:  Monique Santana MD       Resident/Fellow: Elliot Roque MD       Performed By: resident       Location: OB       Pre-Anesthestic Checklist: patient identified, IV checked, risks and benefits discussed, informed consent, monitors and equipment checked, pre-op evaluation, at physician/surgeon's request and post-op pain management  Timeout:       Correct Patient: Yes        Correct Procedure: Yes        Correct Site: Yes        Correct Position: Yes   Procedure Documentation  Procedure: dural puncture epidural       Diagnosis: Labor analgesia        Patient Prep/Sterile Barriers: sterile gloves, mask, patient draped       Skin prep: Chloraprep       Local skin infiltrated with 5 mL of 1% lidocaine.        Insertion Site: L4-5. (midline approach).       Technique: LORT saline        RICH at 4 cm.       Needle Type: [employer]       Needle Gauge: 17.        Needle Length (Inches): 3.5        Spinal Needle Type: Pencan       Spinal Needle (gauge): 25        Spinal Needle Length (inches): 4.69       Catheter: 18 G.         Catheter threaded easily.         5 cm epidural space.         Threaded 9 cm at skin.         # of attempts: 1 and  # of redirects:  0    Assessment/Narrative         Paresthesias: No.       Test dose of 3 mL lidocaine 1.5% w/ 1:200,000 epinephrine at 09:58.         Test dose negative, 3 minutes after injection, for signs of intravascular, subdural, or intrathecal injection.       Insertion/Infusion Method: LORT saline       Aspiration negative for Heme or CSF via Epidural Catheter.       CSF fluid: with Spinal needle.CSF fluid removed: with Epidural needle - not with Epidural needle.    Medication(s) Administered   bupivacaine (MARCAINE) injection 0.125% (diluted from 0.25%, bedside, by MD or CRNA), 8 mL  fentaNYL (SUBLIMAZE) injection, 16 mcg  Medication Administration Time: 5/6/2021 10:05 AM

## 2021-05-06 NOTE — PROGRESS NOTES
Patient arrived to [facility] unit via wheelchair at 1350,with belongings, accompanied by spouse/ significant other, with infant in arms. Received report from June SAUCEDO RN and checked bands. Unit and room orientation completd. Call light given and within arms reach; no concerns present at this time. Continue with plan of care.

## 2021-05-06 NOTE — PROGRESS NOTES
[facility]  FHT Review Note    O:   Patient Vitals for the past 4 hrs:   BP Temp Temp src Resp   21 2213 136/66 98.1  F (36.7  C) Oral 18       FHT: Baseline 150, moderate variability, + accelerations, - decelerations  Robert Lee: 0-1 contractions in 10 minutes    A/P:  Ms. Oanh Interiano is a 35 year old  at 38w0d here for IOL after US showed oligohydramnios and HPI consistent with PROM. Pregnancy complicated by hx of COVID of pregnancy, hx of VAVD and PPH, and MDD/BILL. Labor progressing as expected.    # Labor:   - Cx 3/50/-3 ()>pit (). Currently at 5 mU/min. Continue pitocin augmentation.   FWB: - Category 1, reactive and reassuring FHT  PNC: - Rh pos, Rubella immune, GBS neg, GCT passed, infectious labs wnl    Flaco Bettencourt MD  OB/GYN PGY-2  21 12:16 AM

## 2021-05-06 NOTE — PLAN OF CARE
Data: Contraction frequency q 1.5-4 minutes, palpate mild. Fetal assessment category one.  Denies LOF, headache, vision changes, RUQ pain. Support person Aaron present.  Interventions: Continue uterine/fetal assessment per orders. Vital Signs per order set. Comfort measures birthing ball, quiet environment, position changes.   Plan: Anticipate . Provide labor/coping assistance as needed by patient and support person.  Observe for and notify care provider of indications of progressing labor, need for pain medications, or signs of fetal/maternal compromise

## 2021-05-06 NOTE — PLAN OF CARE
Data: Oanh Interiano transferred to [id number] via wheelchair at 1345. Baby transferred via parent's arms.  Action: Receiving unit notified of transfer: Yes. Patient and family notified of room change. Report given to Sherron Beard RN at 1355. Belongings sent to receiving unit. Accompanied by Registered Nurse. Oriented patient to surroundings. Call light within reach. ID bands double-checked with receiving RN.  Response: Patient tolerated transfer and is stable.

## 2021-05-06 NOTE — PROGRESS NOTES
[facility]  Labor Progress Review Note    S: Doing well, contractions are still fairly mild.    O:   Patient Vitals for the past 4 hrs:   BP Temp Temp src Resp   21 0219 114/55 98  F (36.7  C) Oral 16       SVE: 50/-3    FHT: Baseline 150, moderate variability, + accelerations, - decelerations  Cyrus: 2-3 contractions in 10 minutes    A/P:  Ms. Oanh Interiano is a 35 year old  at 38w0d here for IOL after US showed oligohydramnios and HPI consistent with PROM. Pregnancy complicated by hx of COVID of pregnancy, hx of VAVD and PPH, and MDD/BILL.    # Labor:   - Cx 3/50/-3 ()>pit ()>50/-3. Pitocin increased to 15 mU/min. Continue pitocin augmentation.   FWB: - Category 1, reactive and reassuring FHT  PNC: - Rh pos, Rubella immune, GBS neg, GCT passed, infectious labs wnl    Flaco Bettencourt MD  OB/GYN PGY-2  21 6:17 AM

## 2021-05-06 NOTE — PLAN OF CARE
Pt progressed quickly to complete after the epidural. Pt did not get much relief. Pushed for almost 1 hour.  1131 male, apgars 9+9. Pt with a 1st degree lac with repair. Pt and baby stable post delivery.

## 2021-05-06 NOTE — L&D DELIVERY NOTE
OB Vaginal Delivery Note    Oanh Interiano MRN# [id number]   Age: 35 year old YOB: 1985       GA: 38w0d  GP:   Labor Complications: None   Delivery QBL:  69  Delivery Type: Vaginal, Spontaneous   ROM to Delivery Time: (Delivered) Hours: 3 Minutes: 16  [place] Weight: 3.31 kg (7 lb 4.8 oz)    1 Minute 5 Minute 10 Minute   Apgar Totals: 9   9        KARLENE ELLISON;PIERCE MENDOSA;KARMA GOMES     L&D Delivery Note:     Oanh Interiano is a 35 year old now  who was admitted for IOL at 37w6d for oligohydramnios, suspected PROM though negative ROM+, and a spontaneous decel at term. Her pregnancy was largely uncomplicated except for history of COVID infection and MDD/BILL. On admit she was dilated 3cm. She was started on pitocin but had minimal change over 12 hours on pitocin. She then underwent AROM at 4-5cm and had an epidural placed. She rapidly progressed to complete in just over 2 hours. She pushed for 56 minutes after which she had a spontaneous vaginal delivery of a viable male infant in ANY position. No nuchal cord was noted.  Apgars of 9 and 9 with weight of 7lb 4.84 oz. The cord was double clamped after approximately 2 minutes, when pulsing stopped, and cut.  A cord segment and cord blood were obtained.  30U of IV pitocin was started. The placenta was then delivered using gentle traction and suprapubic pressure.  The uterus was noted to be firm after fundal massage.  The perineum was assessed for lacerations and a first degree perineal laceration was noted.  The laceration was repaired with a single stitch of 3-0 Vicryl. On final examination of the perineum, the repair was noted to be hemostatic.  Total QBL was 69.  The placenta appeared intact with a 3V umbilical cord.  Dr. Mendosa was present for the entire procedure.     Deepak Gomes MD MPH  OB/Gyn PGY-2  21 5:26 PM      Physician Attestation   I was present for this uncomplicated vaginal delivery and the  repair of the above noted lacerations.  I have reviewed and edited the above delivery report to reflect the exact findings and details of the birth.  Mom and baby stable following birth.      Xiomara Mendosa MD   May 6, 2021              Vini Interiano [[id number]]    Labor Event Times    Labor onset date: 21 Onset time:  8:15 AM   Dilation complete date: 21 Complete time: 10:35 AM   Start pushing date/time: 2021 1035      Labor Length    1st Stage (hrs): 2 (min): 20   2nd Stage (hrs): 0 (min): 56   3rd Stage (hrs): 0 (min): 6      Labor Events     labor?: No   steroids: None  Labor Type: Induction/Cervical ripening, Augmentation, AROM  Predominate monitoring during 1st stage: continuous electronic fetal monitoring     Antibiotics received during labor?: No     Rupture date/time: 21 0815   Rupture type: Artificial Rupture of Membranes  Fluid color: Clear  Fluid odor: Normal     Induction: Oxytocin  Induction date/time: 21   Cervical ripening date/time:     Indications for induction: Premature ROM     Augmentation: AROM  Augmentation date/time: 21   Indications for augmentation: Ineffective Contraction Pattern     Delivery/Placenta Date and Time    Delivery Date: 21 Delivery Time: 11:31 AM   Placenta Date/Time: 2021 11:37 AM  Oxytocin given at the time of delivery: after delivery of baby   Other personnel present at delivery:  Provider Role   June Guerrero RN Rothschild, Rahel, MD Obstetrician   Aaron Gomes MD Resident         Vaginal Counts     Initial count performed by 2 team members:  Two Team Members   pedrito mendosa       Needles Suture Needles Sponges (RETIRED) Instruments   Initial counts 2  5    Added to count  1     Relief counts       Final counts 2 1 5          Placed during labor Accounted for at the end of labor   FSE NA NA   IUPC NA NA   Cervadil NA NA              Final count performed by 2 team members:  Two  "Team Members   pedrito mendosa      Final count correct?: Yes     Apgars    Living status: Living   1 Minute 5 Minute 10 Minute 15 Minute 20 Minute   Skin color: 1  1       Heart rate: 2  2       Reflex irritability: 2  2       Muscle tone: 2  2       Respiratory effort: 2  2       Total: 9  9       Apgars assigned by: AMYNOR MCDANIELS RNC     Cord    Cord Complications: None               Stem cell collection?: No       Nelson Measurements    Weight: 7 lb 4.8 oz Length: 1' 8\"   Head circumference: 34.3 cm       Labor Events and Shoulder Dystocia    Fetal Tracing Prior to Delivery: Category 1  Shoulder dystocia present?: Neg     Delivery (Maternal) (Provider to Complete) (993209)    Episiotomy: None  Perineal lacerations: 1st Repaired?: Yes   Repair suture: 3-0 Vicryl  Genital tract inspection done: Pos     Blood Loss  Mother: Oanh Interiano #[id number]   Start of Mother's Information    IO Blood Loss  21 0815 - 21 1754    Delivery QBL (mL) Hospital Encounter 69 mL    Total  69 mL         End of Mother's Information  Mother: Oanh Interiano #[id number]          Delivery - Provider to Complete ([id number])    Attempted Delivery Types (Choose all that apply): Spontaneous Vaginal Delivery  Delivery Type (Choose the 1 that will go to the Birth History): Vaginal, Spontaneous                   Other personnel:  Provider Role   June Guerrero RN Rothschild, Rahel, MD Obstetrician   Aaron Gomes MD Resident                Placenta    Date/Time: 2021 11:37 AM  Removal: Spontaneous  Disposition: Hospital disposal           Anesthesia    Method: Epidural  Cervical dilation at placement: 4-7                Presentation and Position    Presentation: Vertex    Position: Left Occiput Anterior                 Xiomara Mendosa MD  "

## 2021-05-06 NOTE — ANESTHESIA PREPROCEDURE EVALUATION
Anesthesia Pre-Procedure Evaluation    Patient: Oanh Interiano   MRN: [id number] : 1985        Preoperative Diagnosis: * No surgery found *   Procedure :      Past Medical History:   Diagnosis Date     ASCUS with positive high risk HPV 3/2013    + HPV 59, 62     Papanicolaou smear of cervix with low grade squamous intraepithelial lesion (LGSIL) 2010    colp WNL      Past Surgical History:   Procedure Laterality Date     NO HISTORY OF SURGERY        Allergies   Allergen Reactions     Milk Protein Extract Nausea and Vomiting, Hives, Itching, Swelling and GI Disturbance     Itching, hives, swelling, GI distrubance     Onion GI Disturbance     Sulfa Drugs Hives      Social History     Tobacco Use     Smoking status: Never Smoker     Smokeless tobacco: Never Used   Substance Use Topics     Alcohol use: Not Currently     Comment: occ      Wt Readings from Last 1 Encounters:   21 90.5 kg (199 lb 9.6 oz)        Anesthesia Evaluation   Pt has had prior anesthetic. Type: OB Labor Epidural.    No history of anesthetic complications       ROS/MED HX  ENT/Pulmonary:  - neg pulmonary ROS     Neurologic:  - neg neurologic ROS     Cardiovascular:  - neg cardiovascular ROS     METS/Exercise Tolerance:     Hematologic:  - neg hematologic  ROS     Musculoskeletal:       GI/Hepatic:  - neg GI/hepatic ROS     Renal/Genitourinary:       Endo:  - neg endo ROS     Psychiatric/Substance Use:  - neg psychiatric ROS     Infectious Disease:       Malignancy:       Other:            Physical Exam    Airway        Mallampati: I   TM distance: > 3 FB   Neck ROM: full   Mouth opening: > 3 cm    Respiratory Devices and Support         Dental           Cardiovascular             Pulmonary               Other findings: Her pregnancy has been uncomplicated  - MDD/BILL - PTA Zoloft  - COVID-19 on   - Hx VAVD and PPH in first pregnancy    OUTSIDE LABS:  CBC:   Lab Results   Component Value Date    WBC 8.2 2021    WBC 9.0  04/27/2021    HGB 11.2 (L) 05/05/2021    HGB 11.7 04/27/2021    HCT 35.2 05/05/2021    HCT 36.5 04/27/2021     05/05/2021     04/27/2021     BMP: No results found for: NA, POTASSIUM, CHLORIDE, CO2, BUN, CR, GLC  COAGS: No results found for: PTT, INR, FIBR  POC:   Lab Results   Component Value Date    HCG Negative 04/24/2013     HEPATIC: No results found for: ALBUMIN, PROTTOTAL, ALT, AST, GGT, ALKPHOS, BILITOTAL, BILIDIRECT, CARMEN  OTHER:   Lab Results   Component Value Date    TSH 1.65 10/28/2020       Anesthesia Plan    ASA Status:  2, emergent       Anesthesia Type: Epidural.              Consents    Anesthesia Plan(s) and associated risks, benefits, and realistic alternatives discussed. Questions answered and patient/representative(s) expressed understanding.     - Discussed with:  Patient         Postoperative Care            Comments:    Discussed the benefits and risks of epidural including hypotension, nausea, headache, infection, cardiovascular, neurological complications as well as failed epidural.              neg OB ROS.       Elliot Roque MD

## 2021-05-06 NOTE — PROGRESS NOTES
[facility]  Labor Progress Review Note    S: Doing well, contractions are still fairly mild.    O:   Patient Vitals for the past 4 hrs:   BP Temp Temp src Resp   21 0805 115/71 98.6  F (37  C) Oral 18   21 0545 110/56 98.2  F (36.8  C) Oral 18       SVE: 4-5/80/-3, Attempted AROM small fluid unclear if successful as no bulging bag palpated; Chaperoned by RN June Guerrero and Dr. Mendosa.     FHT: Baseline 150, moderate variability, + accelerations, - decelerations  [place]: 2-3 contractions in 10 minutes    A/P:  Ms. Oanh Interiano is a 35 year old  at 38w0d here for IOL after US showed oligohydramnios and HPI consistent with PROM. Pregnancy complicated by hx of COVID of pregnancy, hx of VAVD and PPH, and MDD/BILL.    # Labor:   - SVE: 3/50/-3 ()> pit ()> 4/50/-3 (615) > 4-5/80/-2, AROM (815)  - Attempted AROM as above as patient doesn't seem to be in labor yet  - Pit at 17mu/min  - Plan continue pitocin augmentation  FWB: - Category 1, reactive and reassuring FHT  PNC: - Rh pos, Rubella immune, GBS neg, GCT passed, infectious labs wnl    Deepak Gomes MD MPH  OB/Gyn PGY-2  21 8:43 AM

## 2021-05-07 VITALS
HEART RATE: 71 BPM | OXYGEN SATURATION: 98 % | TEMPERATURE: 98.2 F | DIASTOLIC BLOOD PRESSURE: 72 MMHG | RESPIRATION RATE: 16 BRPM | SYSTOLIC BLOOD PRESSURE: 113 MMHG

## 2021-05-07 LAB — HGB BLD-MCNC: 10.6 G/DL (ref 11.7–15.7)

## 2021-05-07 PROCEDURE — 36415 COLL VENOUS BLD VENIPUNCTURE: CPT | Performed by: STUDENT IN AN ORGANIZED HEALTH CARE EDUCATION/TRAINING PROGRAM

## 2021-05-07 PROCEDURE — 85018 HEMOGLOBIN: CPT | Performed by: STUDENT IN AN ORGANIZED HEALTH CARE EDUCATION/TRAINING PROGRAM

## 2021-05-07 PROCEDURE — 250N000013 HC RX MED GY IP 250 OP 250 PS 637: Performed by: STUDENT IN AN ORGANIZED HEALTH CARE EDUCATION/TRAINING PROGRAM

## 2021-05-07 RX ADMIN — IBUPROFEN 800 MG: 800 TABLET, FILM COATED ORAL at 01:19

## 2021-05-07 RX ADMIN — IBUPROFEN 800 MG: 800 TABLET, FILM COATED ORAL at 07:48

## 2021-05-07 RX ADMIN — ACETAMINOPHEN 650 MG: 325 TABLET, FILM COATED ORAL at 04:50

## 2021-05-07 RX ADMIN — ACETAMINOPHEN 650 MG: 325 TABLET, FILM COATED ORAL at 09:41

## 2021-05-07 RX ADMIN — DOCUSATE SODIUM 50MG AND SENNOSIDES 8.6MG 1 TABLET: 8.6; 5 TABLET, FILM COATED ORAL at 07:48

## 2021-05-07 NOTE — PLAN OF CARE
Pt VSS and postpartum assessments WDL. Pt is up ad sergo, voiding without difficulty, passing gas and eating/drinking normally. Perineum appears to be healing well, no s/s of infection. Breastfeeding infant on cue independently, latch verified. Educated pt on hand expression, pt demonstrated understanding. Expressed 2mL maternal expressed milk after breastfeeding, fed to infant. Taking Tylenol and Ibuprofen for pain, reports good pain management. Pt is bonding well with infant. Support person present at the bedside. Continue with plan of care.

## 2021-05-07 NOTE — PROVIDER NOTIFICATION
Request for Dr. Gomes to write letter for pt's work that includes pt having a vaginal delivery on 5/6/21 at 1131. Thanks.

## 2021-05-07 NOTE — PLAN OF CARE
Patient is stable and discharge to home today with baby. Went over the discharge instructions and verbalized understanding. Questions were encouraged and addressed.

## 2021-05-07 NOTE — PROGRESS NOTES
Post Partum Progress Note    Subjective:  Patient states she is doing well, pain well controlled. Tolerating PO, ambulating without any issues, voiding spontaneously, lochia within normal limits. Denies any fever, chills, SOB, chest pain, N/V,  headache, dizziness. Planning on breastfeeding. Planning condoms and vasectomy for birth control.    Objective:  Patient Vitals for the past 24 hrs:   BP Temp Temp src Pulse Resp SpO2   05/07/21 0940 113/72 98.2  F (36.8  C) Oral 71 16 --   05/07/21 0439 120/78 97.7  F (36.5  C) Oral 67 16 --   05/06/21 2205 118/68 -- -- 71 16 --   05/06/21 1813 121/72 98.2  F (36.8  C) Oral 74 16 98 %   05/06/21 1355 124/81 98.1  F (36.7  C) Oral 75 16 99 %   05/06/21 1335 119/66 -- -- -- -- 99 %   05/06/21 1320 121/64 -- -- -- -- 96 %   05/06/21 1305 115/68 -- -- -- -- 99 %   05/06/21 1255 116/72 -- -- -- 16 97 %   05/06/21 1235 116/65 -- -- -- -- 96 %   05/06/21 1220 113/69 -- -- -- -- 96 %   05/06/21 1210 113/67 -- -- -- -- 97 %   05/06/21 1150 123/57 -- -- -- -- 98 %   05/06/21 1135 (!) 145/58 -- -- -- -- 97 %   05/06/21 1100 109/59 -- -- -- -- 99 %   05/06/21 1035 116/74 -- -- -- -- 96 %   05/06/21 1030 110/69 -- -- -- -- 99 %   05/06/21 1025 111/61 -- -- -- -- 98 %   05/06/21 1022 104/58 -- -- -- -- --   05/06/21 1020 93/55 -- -- -- -- 100 %   05/06/21 1018 95/54 -- -- -- -- --   05/06/21 1016 (!) 86/50 -- -- -- -- --   05/06/21 1014 90/50 -- -- -- -- --   05/06/21 1012 92/53 -- -- -- -- --   05/06/21 1010 99/55 -- -- -- -- --   05/06/21 1008 96/56 -- -- -- -- 100 %   05/06/21 1006 105/56 -- -- -- -- --   05/06/21 1004 103/54 -- -- -- -- --   05/06/21 1002 (!) 88/53 -- -- -- -- --       General: NAD, resting comfortably  Resp:  Normal effort and rate on room air  Cv: well perfused  Abd:  Soft, nontender, nondistended, fundus firm below the umbilicus  Ext:  No edema in bilateral LE    HGB  Recent Labs   Lab 05/07/21  0641 05/05/21  0972   HGB 10.6* 11.2*       Assessment and Plan:  35  year old old  PPD#1 s/p . Doing well, starting to meet discharge goals.    Postpartum Care  - Pain: Tylenol, ibuprofen.  - Heme: 11.2 > QBL 69 mL > 10.6. Asymptomatic from blood loss anemia  - GI: Regular diet. Bowel regimen. Antiemetics PRN. Tolerating PO  - Rh positive, Rubella immune  - voiding spontaneously  - breastfeeding  - vasectomy and condoms for birth control    MR BP <4 hrs  - continue monitoring closely  - no symptoms or severe features. Does not meet criteria for HTN disorder of pregnancy    MDD/BILL   - PTA Zoloft  - mood stable. Recommend 1 week mood check    Anticipate discharge to home PPD#1    Flaco Bettencourt MD  OB/GYN PGY-2  21 7:00 AM    Physician Attestation   I, Maddie Arteaga MD, personally examined and evaluated this patient.  I discussed the patient with the resident/fellow and care team, and agree with the assessment and plan of care as documented in the note of 21.      I personally reviewed vital signs, medications and labs.    Ivory findings: Oanh Interiano is a 35 year old  postpartum day number 1 s/p , currently doing well and meeting goals for 24 hour discharge.  Patient's pain is well controlled.  She is tolerating regular diet, voiding without issues, ambulating without dizziness.  Post partum Hgb is appropriate for delivery blood loss.  Discussed s/sx of mastitis and postpartum depression.  Reviewed normal postpartum bleeding.  Plan for 1 week postpartum mood check.    Maddie Arteaga MD  Date of Service (when I saw the patient): 21

## 2021-05-10 DIAGNOSIS — N64.4 NIPPLE SORENESS: Primary | ICD-10-CM

## 2021-05-10 RX ORDER — MUPIROCIN 20 MG/G
OINTMENT TOPICAL
Qty: 30 G | Refills: 1 | Status: SHIPPED | OUTPATIENT
Start: 2021-05-10 | End: 2021-06-15

## 2021-05-25 ENCOUNTER — E-VISIT (OUTPATIENT)
Dept: OBGYN | Facility: CLINIC | Age: 36
End: 2021-05-25
Payer: COMMERCIAL

## 2021-05-25 DIAGNOSIS — O92.79 CLOGGED DUCT, POSTPARTUM: Primary | ICD-10-CM

## 2021-05-25 PROCEDURE — 99207 PR NON-BILLABLE SERV PER CHARTING: CPT | Performed by: OBSTETRICS & GYNECOLOGY

## 2021-05-26 ENCOUNTER — APPOINTMENT (OUTPATIENT)
Dept: LAB | Facility: CLINIC | Age: 36
End: 2021-05-26
Payer: COMMERCIAL

## 2021-06-15 ENCOUNTER — PRENATAL OFFICE VISIT (OUTPATIENT)
Dept: OBGYN | Facility: CLINIC | Age: 36
End: 2021-06-15
Payer: COMMERCIAL

## 2021-06-15 VITALS
TEMPERATURE: 97 F | SYSTOLIC BLOOD PRESSURE: 82 MMHG | HEART RATE: 77 BPM | BODY MASS INDEX: 27.4 KG/M2 | DIASTOLIC BLOOD PRESSURE: 56 MMHG | WEIGHT: 185 LBS | HEIGHT: 69 IN

## 2021-06-15 PROBLEM — Z36.89 ENCOUNTER FOR TRIAGE IN PREGNANT PATIENT: Status: RESOLVED | Noted: 2021-05-05 | Resolved: 2021-06-15

## 2021-06-15 PROBLEM — O41.00X0 OLIGOHYDRAMNIOS: Status: RESOLVED | Noted: 2021-05-05 | Resolved: 2021-06-15

## 2021-06-15 PROCEDURE — 99207 PR POST PARTUM EXAM: CPT | Performed by: OBSTETRICS & GYNECOLOGY

## 2021-06-15 SDOH — HEALTH STABILITY: MENTAL HEALTH: HOW OFTEN DO YOU HAVE 6 OR MORE DRINKS ON ONE OCCASION?: NOT ASKED

## 2021-06-15 SDOH — HEALTH STABILITY: MENTAL HEALTH: HOW OFTEN DO YOU HAVE A DRINK CONTAINING ALCOHOL?: 2-4 TIMES A MONTH

## 2021-06-15 SDOH — HEALTH STABILITY: MENTAL HEALTH: HOW MANY STANDARD DRINKS CONTAINING ALCOHOL DO YOU HAVE ON A TYPICAL DAY?: 1 OR 2

## 2021-06-15 ASSESSMENT — MIFFLIN-ST. JEOR: SCORE: 1593.53

## 2021-06-15 ASSESSMENT — ANXIETY QUESTIONNAIRES
IF YOU CHECKED OFF ANY PROBLEMS ON THIS QUESTIONNAIRE, HOW DIFFICULT HAVE THESE PROBLEMS MADE IT FOR YOU TO DO YOUR WORK, TAKE CARE OF THINGS AT HOME, OR GET ALONG WITH OTHER PEOPLE: NOT DIFFICULT AT ALL
5. BEING SO RESTLESS THAT IT IS HARD TO SIT STILL: NOT AT ALL
GAD7 TOTAL SCORE: 1
2. NOT BEING ABLE TO STOP OR CONTROL WORRYING: NOT AT ALL
6. BECOMING EASILY ANNOYED OR IRRITABLE: SEVERAL DAYS
7. FEELING AFRAID AS IF SOMETHING AWFUL MIGHT HAPPEN: NOT AT ALL
1. FEELING NERVOUS, ANXIOUS, OR ON EDGE: NOT AT ALL
3. WORRYING TOO MUCH ABOUT DIFFERENT THINGS: NOT AT ALL

## 2021-06-15 ASSESSMENT — PATIENT HEALTH QUESTIONNAIRE - PHQ9
SUM OF ALL RESPONSES TO PHQ QUESTIONS 1-9: 3
5. POOR APPETITE OR OVEREATING: NOT AT ALL

## 2021-06-15 NOTE — PROGRESS NOTES
"Oanh is here for a 6-week postpartum checkup.    She had a  of a viable boy, weight 7 pounds 5 oz., with induction for oligohydramnios, diagnosed on ultrasound for f/u COVID in pregnancy.  Since delivery, she has been breast feeding.  She has no signs of infection, bleeding or other complications.  She is not pregnant.  We discussed contraception and she has chosen vasectomy.   has scheduled.   Mood is good.  Today's Depression Rating was   PHQ-9 SCORE 6/15/2021   PHQ-9 Total Score -   PHQ-9 Total Score 3     Postpartum course has been easiest one so far.    EXAM:  Vitals:    06/15/21 1035   BP: (!) 82/56   BP Location: Right arm   Patient Position: Sitting   Cuff Size: Adult Large   Pulse: 77   Temp: 97  F (36.1  C)   TempSrc: Oral   Weight: 83.9 kg (185 lb)   Height: 1.753 m (5' 9\")     HEENT: grossly normal.  NECK: no lymphadenopathy or thyroidomegaly.  LUNGS: CTA X 2, no rales or crackles.  BREASTS: symmetric, no masses or discharge  BACK: No spinal or CVA tenderness.  HEART: RRR without murmurs clicks or gallops.  ABDOMEN: soft, non tender, good bowel sounds, without masses rebound, guarding or tenderness.      PELVIC:    External genitalia: normal without lesion, repair well healed.                            Vagina: normal mucosa and rugae, no discharge.  Cervix: multiparous, well healed, without lesion.  Uterus: non pregnant in size, firm , mobile, no lesions.  Adnexa: non tender, without masses    EXTREMITIES: Warm to touch, good pulses, no ankle edema or calf tenderness.  NEUROLOGIC: grossly normal.    ASSESSMENT:   Normal 6-week postpartum exam after .    PLAN:  Pap smear Due  and vasectomy for contraception.  Discussed f/u SA to confirm vasectomy successful.  Discussed indications for induction. Unclear if ROM or placental insufficiency. Difficult to know if sequela of COVID in pregnancy or not.    "

## 2021-06-16 ASSESSMENT — ANXIETY QUESTIONNAIRES: GAD7 TOTAL SCORE: 1

## 2021-06-30 ENCOUNTER — MEDICAL CORRESPONDENCE (OUTPATIENT)
Dept: HEALTH INFORMATION MANAGEMENT | Facility: CLINIC | Age: 36
End: 2021-06-30

## 2021-07-09 ENCOUNTER — OFFICE VISIT (OUTPATIENT)
Dept: DERMATOLOGY | Facility: CLINIC | Age: 36
End: 2021-07-09
Payer: COMMERCIAL

## 2021-07-09 VITALS — SYSTOLIC BLOOD PRESSURE: 104 MMHG | DIASTOLIC BLOOD PRESSURE: 63 MMHG | HEART RATE: 81 BPM | OXYGEN SATURATION: 96 %

## 2021-07-09 DIAGNOSIS — L82.1 SEBORRHEIC KERATOSIS: ICD-10-CM

## 2021-07-09 DIAGNOSIS — L81.4 LENTIGO: ICD-10-CM

## 2021-07-09 DIAGNOSIS — D48.5 NEOPLASM OF UNCERTAIN BEHAVIOR OF SKIN: Primary | ICD-10-CM

## 2021-07-09 DIAGNOSIS — D22.9 NEVUS: ICD-10-CM

## 2021-07-09 DIAGNOSIS — D18.01 ANGIOMA OF SKIN: ICD-10-CM

## 2021-07-09 DIAGNOSIS — D23.9 DERMATOFIBROMA: ICD-10-CM

## 2021-07-09 PROCEDURE — 88305 TISSUE EXAM BY PATHOLOGIST: CPT | Performed by: DERMATOLOGY

## 2021-07-09 PROCEDURE — 11102 TANGNTL BX SKIN SINGLE LES: CPT | Performed by: PHYSICIAN ASSISTANT

## 2021-07-09 PROCEDURE — 11103 TANGNTL BX SKIN EA SEP/ADDL: CPT | Performed by: PHYSICIAN ASSISTANT

## 2021-07-09 PROCEDURE — 99203 OFFICE O/P NEW LOW 30 MIN: CPT | Mod: 25 | Performed by: PHYSICIAN ASSISTANT

## 2021-07-09 NOTE — LETTER
7/9/2021         RE: Oanh Interiano  [address] [place] [address]        Dear Colleague,    Thank you for referring your patient, Oanh Interiano, to the [facility]. Please see a copy of my visit note below.    HPI:   Oanh Interiano is a 33 year old female who presents for Full skin cancer screening.  Last Skin Exam: couple years ago       1st Baseline: YES  Personal HX of Skin Cancer: none   Personal HX of Malignant Melanoma: none   Family HX of Skin Cancer / Malignant Melanoma: mother, NMSC  Personal HX of Atypical Moles: none  Risk factors: sun exposure,  in her youth, tanning bed use in her youth  New / Changing lesions: yes, spot on back  Social History: has 3 children 6 yo daughter, 2.6 yo daughter and 2 mo old son  On review of systems, there are no further skin complaints, patient is feeling otherwise well.  See patient intake sheet.  ROS of the following were done and are negative: Constitutional, Eyes, Ears, Nose,   Mouth, Throat, Cardiovascular, Respiratory, GI, Genitourinary, Musculoskeletal,   Psychiatric, Endocrine, Allergic/Immunologic.      PHYSICAL EXAM:   /63   Pulse 81   SpO2 96%   Skin exam performed as follows: Type 2 skin. Mood appropriate  Alert and Oriented X 3. Well developed, well nourished in no distress.  General appearance: Normal  Head including face: Normal  Eyes: conjunctiva and lids: Normal  Mouth: Lips, teeth, gums: Normal  Neck: Normal  Chest-breast/axillae: Normal  Back: Normal  Spleen and liver: Normal  Cardiovascular: Exam of peripheral vascular system by observation for swelling, varicosities, edema: Normal  Genitalia: groin, buttocks: Normal  Extremities: digits/nails (clubbing): Normal  Eccrine and Apocrine glands: Normal  Right upper extremity: Normal  Left upper extremity: Normal  Right lower extremity: Normal  Left lower extremity: Normal  Skin: Scalp and body hair: See below    Pt deferred exam of  breasts, groin, buttocks: No    Other physical findings:  1. Multiple pigmented macules on extremities and trunk  2. Multiple pigmented macules on face, trunk and extremities  3. Multiple vascular papules on trunk, arms and legs  4. Multiple scattered keratotic plaques  5. 4 mm tan fleshy papule on the left upper back, right upper back, left mid back       Except as noted above, no other signs of skin cancer or melanoma.     ASSESSMENT/PLAN:   Benign Full skin cancer screening today.      Patient with history of none  Advised on monthly self exams and 1 year  Patient Education: Appropriate brochures given.    Multiple benign appearing nevi on arms, legs and trunk. Discussed ABCDEs of melanoma and sunscreen.   Multiple lentigos on arms, legs and trunk. Advised benign, no treatment needed.  Multiple scattered angiomas. Advised benign, no treatment needed.   Seborrheic keratosis on arms, legs and trunk. Advised benign, no treatment needed.  Dermal nevus r/o atypicality on the right upper back, left upper back, left mid back. Shave biopsy performed.  Area cleaned and anesthetized with 1% lidocaine with epinephrine.  Dermablade used to remove the lesion and sent to pathology. Bleeding was cauterized. Pt tolerated procedure well with no complications.   Dermatofibroma on the right leg x 2. Advised benign no treatment needed.         Follow-up: yearly FSE/PRN sooner    1.) Patient was asked about new and changing moles. YES  2.) Patient received a complete physical skin examination: YES  3.) Patient was counseled to perform a monthly self skin examination: YES  Scribed By: Bria Deluna, MS, PAVIDAL        Again, thank you for allowing me to participate in the care of your patient.        Sincerely,        Bria Deluna PA-C

## 2021-07-09 NOTE — PATIENT INSTRUCTIONS
Wound Care Instructions     FOR SUPERFICIAL WOUNDS     [facility] [phone]                 AFTER 24 HOURS YOU SHOULD REMOVE THE BANDAGE AND BEGIN DAILY DRESSING CHANGES AS FOLLOWS:     1) Remove Dressing.     2) Clean and dry the area with tap water using a Q-tip or sterile gauze pad.     3) Apply Vaseline, Aquaphor, Polysporin ointment or Bacitracin ointment over entire wound.  Do NOT use Neosporin ointment.     4) Cover the wound with a band-aid, or a sterile non-stick gauze pad and micropore paper tape    REPEAT THESE INSTRUCTIONS AT LEAST ONCE A DAY UNTIL THE WOUND HAS COMPLETELY HEALED.    It is an old wives tale that a wound heals better when it is exposed to air and allowed to dry out. The wound will heal faster with a better cosmetic result if it is kept moist with ointment and covered with a bandage.    **Do not let the wound dry out.**    Supplies Needed:      *Cotton tipped applicators (Q-tips)    *Vaseline, Aquaphor, Polysporin or Bacitracin Ointment (NOT NEOSPORIN)    *Band-aids or non-stick gauze pads and micropore paper tape.      PATIENT INFORMATION:    During the healing process you will notice a number of changes. All wounds develop a small halo of redness surrounding the wound.  This means healing is occurring. Severe itching with extensive redness usually indicates sensitivity to the ointment or bandage tape used to dress the wound.  You should call our office if this develops.      Swelling  and/or discoloration around your surgical site is common, particularly when performed around the eye.    All wounds normally drain.  The larger the wound the more drainage there will be.  After 7-10 days, you will notice the wound beginning to shrink and new skin will begin to grow.  The wound is healed when you can see skin has formed over the entire area.  A healed wound has a healthy, shiny look to the surface and is red to dark pink in color to normalize.  Wounds may take approximately  4-6 weeks to heal.  Larger wounds may take 6-8 weeks.  After the wound is healed you may discontinue dressing changes.    You may experience a sensation of tightness as your wound heals. This is normal and will gradually subside.    Your healed wound may be sensitive to temperature changes. This sensitivity improves with time, but if you re having a lot of discomfort, try to avoid temperature extremes.    Patients frequently experience itching after their wound appears to have healed because of the continue healing under the skin.  Plain Vaseline will help relieve the itching.      POSSIBLE COMPLICATIONS    BLEEDIN. Leave the bandage in place.  2. Use tightly rolled up gauze or a cloth to apply direct pressure over the bandage for 30  minutes.  3. Reapply pressure for an additional 30 minutes if necessary  4. Use additional gauze and tape to maintain pressure once the bleeding has stopped.

## 2021-07-09 NOTE — PROGRESS NOTES
HPI:   Oanh Interiano is a 33 year old female who presents for Full skin cancer screening.  Last Skin Exam: couple years ago       1st Baseline: YES  Personal HX of Skin Cancer: none   Personal HX of Malignant Melanoma: none   Family HX of Skin Cancer / Malignant Melanoma: mother, NMSC  Personal HX of Atypical Moles: none  Risk factors: sun exposure,  in her youth, tanning bed use in her youth  New / Changing lesions: yes, spot on back  Social History: has 3 children 4 yo daughter, 2.4 yo daughter and 2 mo old son  On review of systems, there are no further skin complaints, patient is feeling otherwise well.  See patient intake sheet.  ROS of the following were done and are negative: Constitutional, Eyes, Ears, Nose,   Mouth, Throat, Cardiovascular, Respiratory, GI, Genitourinary, Musculoskeletal,   Psychiatric, Endocrine, Allergic/Immunologic.      PHYSICAL EXAM:   /63   Pulse 81   SpO2 96%   Skin exam performed as follows: Type 2 skin. Mood appropriate  Alert and Oriented X 3. Well developed, well nourished in no distress.  General appearance: Normal  Head including face: Normal  Eyes: conjunctiva and lids: Normal  Mouth: Lips, teeth, gums: Normal  Neck: Normal  Chest-breast/axillae: Normal  Back: Normal  Spleen and liver: Normal  Cardiovascular: Exam of peripheral vascular system by observation for swelling, varicosities, edema: Normal  Genitalia: groin, buttocks: Normal  Extremities: digits/nails (clubbing): Normal  Eccrine and Apocrine glands: Normal  Right upper extremity: Normal  Left upper extremity: Normal  Right lower extremity: Normal  Left lower extremity: Normal  Skin: Scalp and body hair: See below    Pt deferred exam of breasts, groin, buttocks: No    Other physical findings:  1. Multiple pigmented macules on extremities and trunk  2. Multiple pigmented macules on face, trunk and extremities  3. Multiple vascular papules on trunk, arms and legs  4. Multiple scattered keratotic  plaques  5. 4 mm tan fleshy papule on the left upper back, right upper back, left mid back       Except as noted above, no other signs of skin cancer or melanoma.     ASSESSMENT/PLAN:   Benign Full skin cancer screening today.      Patient with history of none  Advised on monthly self exams and 1 year  Patient Education: Appropriate brochures given.    Multiple benign appearing nevi on arms, legs and trunk. Discussed ABCDEs of melanoma and sunscreen.   Multiple lentigos on arms, legs and trunk. Advised benign, no treatment needed.  Multiple scattered angiomas. Advised benign, no treatment needed.   Seborrheic keratosis on arms, legs and trunk. Advised benign, no treatment needed.  Dermal nevus r/o atypicality on the right upper back, left upper back, left mid back. Shave biopsy performed.  Area cleaned and anesthetized with 1% lidocaine with epinephrine.  Dermablade used to remove the lesion and sent to pathology. Bleeding was cauterized. Pt tolerated procedure well with no complications.   Dermatofibroma on the right leg x 2. Advised benign no treatment needed.         Follow-up: yearly FSE/PRN sooner    1.) Patient was asked about new and changing moles. YES  2.) Patient received a complete physical skin examination: YES  3.) Patient was counseled to perform a monthly self skin examination: YES  Scribed By: Bria Deluna MS, AMBROSE

## 2021-07-14 ENCOUNTER — TELEPHONE (OUTPATIENT)
Dept: DERMATOLOGY | Facility: CLINIC | Age: 36
End: 2021-07-14

## 2021-07-14 LAB — COPATH REPORT: NORMAL

## 2021-07-14 NOTE — TELEPHONE ENCOUNTER
Called patient- unable to LM due to VM being full.    Heather RN-BSN-PHN  [facility]  [phone]

## 2021-07-14 NOTE — TELEPHONE ENCOUNTER
----- Message from Bria Deluna PA-C sent at 7/14/2021  3:57 PM CDT -----  Please notify patient of benign pathology results. No further treatment is needed. Recommend regular skin exams.

## 2021-07-15 NOTE — TELEPHONE ENCOUNTER
Patient called back.    Educated patient on biopsy results- intradermal melanocytic nevus x3, benign.    No further treatment needed.    Recommended annual skin exams.    Patient voiced understanding.    LUIS Romero-BSN-N  [facility]  [phone]

## 2021-07-15 NOTE — TELEPHONE ENCOUNTER
Called and LM for patient to call back in regards to biopsy results x3.    LUIS Romero-BSN-PHN  [facility]  [phone]

## 2021-10-02 ENCOUNTER — HEALTH MAINTENANCE LETTER (OUTPATIENT)
Age: 36
End: 2021-10-02

## 2021-12-22 ENCOUNTER — IMMUNIZATION (OUTPATIENT)
Dept: NURSING | Facility: CLINIC | Age: 36
End: 2021-12-22
Payer: COMMERCIAL

## 2021-12-22 PROCEDURE — 91300 PR COVID VAC PFIZER DIL RECON 30 MCG/0.3 ML IM: CPT

## 2021-12-22 PROCEDURE — 0004A PR COVID VAC PFIZER DIL RECON 30 MCG/0.3 ML IM: CPT

## 2022-01-17 NOTE — PROGRESS NOTES
Important Information for Provider:     Patient presents for new ob teaching and labs, third pregnancy,AMA. Ordered first trimester screening/NIPT. Handouts reviewed and given. Recommended B6, Unisom for nausea. TSH drawn with NOB labs, strong family history of thyroid disease. Has NOB with Dr Benito 3/27/2020    Caffeine intake/servings daily - 1/2 cup daily  Calcium intake/servings daily - 3  Exercise 4 times weekly - describe ; yoga, cardio, walks, precautions given  Sunscreen used - Yes  Seatbelts used - Yes  Guns stored in the home - No  Self Breast Exam - Yes  Pap test up to date -  Yes  Eye exam up to date -  Yes  Dental exam up to date -  Yes  Immunizations reviewed and up to date - Yes  Abuse: Current or Past (Physical, Sexual or Emotional) - No  Do you feel safe in your environment - Yes  Do you cope well with stress - Yes  Do you suffer from insomnia - No        Prenatal OB Questionnaire  Patient supplied answers from flow sheet for:  Prenatal OB Questionnaire.  Past Medical History  Diabetes?: No  Hypertension : No  Heart disease, mitral valve prolapse or rheumatic fever?: No  An autoimmune disease such as lupus or rheumatoid arthritis?: No  Kidney disease or urinary tract infection?: No  Epilepsy, seizures or spells?: No  Migraine headaches?: No  A stroke or loss of function or sensation?: No  Any other neurological problems?: No  Have you ever been treated for depression?: (!) Yes  Are you having problems with crying spells or loss of self-esteem?: No  Have you ever required psychiatric care?: at 14 years old for eating disorder  Have you ever had hepatitis, liver disease or jaundice?: No  Have you been treated for blood clots in your veins, deep vein thrombosis, inflammation in the veins, thrombosis, phlebitis, pulmonary embolism or varicosities?: No  Have you had excessive bleeding after surgery or dental work?: No  Do you bleed more than other women after a cut or scratch?: No  Do you have a history  [FreeTextEntry1] : 11/5/2021 - CXR - no infiltrate.   Rib films - no fracture. of anemia?: No  Have you ever had thyroid problems or taken thyroid medication?: No   Do you have any endocrine problems?: No  Have you ever been in a major accident or suffered serious trauma?: No  Within the last year, has anyone hit, slapped, kicked or otherwise hurt you?: No  In the last year, has anyone forced you to have sex when you didn't want to?: No    Past Medical History 2   Have you ever received a blood transfusion?: No  Would you refuse a blood transfusion if a doctor judged it to be medically necessary?: No   If you answered Yes, would you rather die than receive a blood transfusion?: No  If you answered Yes, is this for Hoahaoism reasons?: No  Does anyone in your home smoke?: No  Do you use tobacco products?: No  Do you drink beer, wine or hard liquor?: No  Do you use any of the following: marijuana, speed, cocaine, heroin, hallucinogens or other drugs?: No   Is your blood type Rh negative?: No  Have you ever had abnormal antibodies in your blood?: No  Have you ever had asthma?: No  Have you ever had tuberculosis?: No  Do you have any allergies to drugs or over-the-counter medications?: (!) Yes sulfa  Allergies: Dust Mites, Aspartame, Ethanol, Venlafaxine, Hydrochloride, Sertraline: No  Have you had any breast problems?: No  Have you ever breastfeed?: (!) Yes  Have you had any gynecological surgical procedures such as cervical conization, a LEEP procedure, laser treatment, cryosurgery of the cervix or a dilation and curettage, etc?: No  Have you ever had any other surgical procedures?: Eating disorder at 14 years old  Have you been hospitalized for a nonsurgical reason excluding normal delivery?: No  Have you ever had any anesthetic complications?: No  Have you ever had an abnormal pap smear?: Yes    Past Medical History (Continued)  Do you have a history of abnormalities of the uterus?: No  Did your mother take YUKI or any other hormones when she was pregnant with you?: No  Did it take you more than  a year to become pregnant?: No  Have you ever been evaluated or treated for infertility?: No  Is there a history of medical problems in your family, which you feel may be important to this pregnancy?: No  Do you have any other problems we have not asked about which you feel may be important to this pregnancy?: prolonged deliveries    Symptoms since last menstrual period  Do you have any of the following symptoms: abdominal pain, blood in stools or urine, chest pain, shortness of breath, coughing or vomiting up blood, your heart racing or skipping beats, nausea and vomiting, pain on urination or vaginal discharge or bleed: (!) Yes  Will the patient be 35 years old or older at the time of delivery?: (!) Yes    Has the patient, baby's father or anyone in either family had:  Thalassemia (Italian, Greek, Mediterranean or  background only) and an MCV result less than 80?: No  Neural tube defect such as meningomyelocele, spina bifida or anencephaly?: No  Congenital heart defect?: No  Down's Syndrome?: No  Meet-Sachs disease (Episcopalian, Cajun, Romansh-Bollinger)?: No  Sickle cell disease or trait ()?: No  Hemophilia or other inherited problems of blood?: No  Muscular dystrophy?: No  Cystic fibrosis?: No  [place]'s chorea?: No  Mental retardation/autism?: No  If yes, was the person tested for fragile X?: No  Any other inherited genetic or chromosomal disorder?: No  Maternal metabolic disorder (e.g Insulin-dependent diabetes, PKU)?: No  A child with birth defects not listed above?: No  Recurrent pregnancy loss or stillbirth?: Mother in law had multiple SAB's   Has the patient had any medications/street drugs/alcohol since her last menstrual period?: No  Does the patient or baby's father have any other genetic risks?: No    Infection History   Do you object to being tested for Hepatitis B?: No  Do you object to being tested for HIV?: No   Do you feel that you are at high risk for coming in contact with the AIDS  virus?: No  Have you ever been treated for tuberculosis?: No  Have you ever had a positive skin test for tuberculosis?: No  Do you live with someone who has tuberculosis?: No  Have you ever been exposed to tuberculosis?: No  Do you have genital herpes?: No  Does your partner have genital herpes?: No  Have you had a viral illness since your last period?: No  Have you ever had gonorrhea, chlamydia, syphilis, venereal warts, trichomoniasis, pelvic inflammatory disease or any other sexually transmitted disease?: No  Do you know if you are a genital group B streptococcus carrier?: No  Have you had chicken pox/varicella?: (!) Yes   Have you been vaccinated against chicken Pox?: No  Have you had any other infectious diseases?: No      Allergies as of 3/5/2020:    Allergies as of 03/05/2020 - Reviewed 03/05/2020   Allergen Reaction Noted     Milk protein extract Nausea and Vomiting, Hives, Itching, Swelling, and GI Disturbance 01/22/2019     Onion GI Disturbance 01/22/2019     Sulfa drugs Hives 09/23/2010       Current medications are:  Current Outpatient Medications   Medication Sig Dispense Refill     sertraline (ZOLOFT) 50 MG tablet Take 1/2 tablet (25 mg) for 1-2 weeks, then increase to 1 tablet orally daily 30 tablet 1         Early ultrasound screening tool:    Does patient have irregular periods?  No  Did patient use hormonal birth control in the three months prior to positive urine pregnancy test? No  Is the patient breastfeeding?  No  Is the patient 10 weeks or greater at time of education visit?  No

## 2022-01-19 ENCOUNTER — MYC MEDICAL ADVICE (OUTPATIENT)
Dept: OBGYN | Facility: CLINIC | Age: 37
End: 2022-01-19
Payer: COMMERCIAL

## 2022-01-19 ASSESSMENT — ANXIETY QUESTIONNAIRES
GAD7 TOTAL SCORE: 5
GAD7 TOTAL SCORE: 5
3. WORRYING TOO MUCH ABOUT DIFFERENT THINGS: SEVERAL DAYS
2. NOT BEING ABLE TO STOP OR CONTROL WORRYING: SEVERAL DAYS
7. FEELING AFRAID AS IF SOMETHING AWFUL MIGHT HAPPEN: NOT AT ALL
4. TROUBLE RELAXING: SEVERAL DAYS
1. FEELING NERVOUS, ANXIOUS, OR ON EDGE: SEVERAL DAYS
5. BEING SO RESTLESS THAT IT IS HARD TO SIT STILL: NOT AT ALL
7. FEELING AFRAID AS IF SOMETHING AWFUL MIGHT HAPPEN: NOT AT ALL
6. BECOMING EASILY ANNOYED OR IRRITABLE: SEVERAL DAYS

## 2022-01-19 ASSESSMENT — PATIENT HEALTH QUESTIONNAIRE - PHQ9
SUM OF ALL RESPONSES TO PHQ QUESTIONS 1-9: 4
10. IF YOU CHECKED OFF ANY PROBLEMS, HOW DIFFICULT HAVE THESE PROBLEMS MADE IT FOR YOU TO DO YOUR WORK, TAKE CARE OF THINGS AT HOME, OR GET ALONG WITH OTHER PEOPLE: SOMEWHAT DIFFICULT
SUM OF ALL RESPONSES TO PHQ QUESTIONS 1-9: 4

## 2022-01-19 NOTE — TELEPHONE ENCOUNTER
Pharmacy sent refill request for sertraline.  Pt last PHQ9 and GAD7 were done 6/2021 at her office visit.  Sent questionnaires to pt.  Can refiill based on those responses.  Adriana Rasheed RN

## 2022-01-20 ASSESSMENT — ANXIETY QUESTIONNAIRES: GAD7 TOTAL SCORE: 5

## 2022-01-20 ASSESSMENT — PATIENT HEALTH QUESTIONNAIRE - PHQ9: SUM OF ALL RESPONSES TO PHQ QUESTIONS 1-9: 4

## 2022-06-10 NOTE — PROGRESS NOTES
ASSESSMENT & PLAN    Oanh was seen today for pain.    Diagnoses and all orders for this visit:    Injury of left ankle, initial encounter  -     XR Ankle Left G/E 3 Views; Future  -     Physical Therapy Referral; Future  -     Ankle/Foot Bracing Supplies Order    Sprain of anterior talofibular ligament of left ankle, subsequent encounter  -     Physical Therapy Referral; Future  -     Ankle/Foot Bracing Supplies Order      This issue is acute and Unchanged.    Discussed the likely injuries associated with a lateral ankle sprain and typical treatment including rest from irritating activities and pain free weight bearing - ankle brace.  Patient will need a gradual rehabilitation program focusing on range of motion, strengthening and proprioception - formal physical therapy.  Discussed a gradual return to sports and activities once pain free, no swelling, full range of motion and full strength.  Follow up at this time is only as needed - if symptoms do not improve.  Recommended ankle brace use with activities for at least 6 months post injury.     Plan:  - Today's Plan of Care:  Consider Ankle Brace - Tri Lock    [phone]    Return to Sports Criteria : pain free, full range of motion and full strength  - Would then recommend very gradual return to activities with rest and follow up appointment if pain or symptoms return.    -We also discussed other future treatment options:  MR Left Ankle    Follow Up: 6 - 8 weeks    Concerning signs and symptoms were reviewed.  The patient expressed understanding of this management plan and all questions were answered at this time.    Patricia Frances MD [facility]  Sports Medicine Physician  [facility]      -----  Chief Complaint   Patient presents with     Left Ankle - Pain       SUBJECTIVE  Oanh Interiano is a/an 36 year old female who is seen as a self referral for evaluation of left ankle pain.     The patient is  "seen by themselves.    Onset: 3 month(s) ago. Patient describes injury as walking in heeled boots and left ankle \"gave out\" and inverted ankle.  Had swelling and pain immediately.  - Training for a triathlon, more symptoms with running than other activities.    Location of Pain: left lateral malleolus   Worsened by: inverting ankle   Better with: ankle brace   Treatments tried: rest/activity avoidance and ice  Associated symptoms: feeling of instability, swelling     Orthopedic/Surgical history: YES - chronic left ankle and foot pain/instability, saw Dr. Read 2019 for foot issues, will occasionally roll her ankle  Social History/Occupation:      No family history pertinent to patient's problem today.    REVIEW OF SYSTEMS:  Review of Systems  Skin: no bruising, yes swelling  Musculoskeletal: as above  Neurologic: no numbness, paresthesias  Remainder of review of systems is negative including constitutional, CV, pulmonary, GI, except as noted in HPI or medical history.    OBJECTIVE:  BP 97/65   Pulse 71   Wt 81.9 kg (180 lb 9.6 oz)   BMI 26.67 kg/m     General: healthy, alert and in no distress  HEENT: no scleral icterus or conjunctival erythema  Skin: no suspicious lesions or rash. No jaundice.  CV: distal perfusion intact  Resp: normal respiratory effort without conversational dyspnea   Psych: normal mood and affect  Gait: normal steady gait with appropriate coordination and balance  Neuro: Normal light sensory exam of lower extremity    Bilateral Foot and Ankle Exam:  Inspection:       edema noted mild left ankle    Foot inspection:       no deformity noted    Tender:       lateral ankle ligaments  left    Non-Tender:       remainder of ankle and foot bilateral    ROM:        Full active and passive ROM, ankle dorsiflexion, plantarflexion, inversion, eversion, great toe dorsiflexion, remainder of toes, midfoot and subtalar bilateral    Strength:       ankle dorsiflexion 5/5 bilateral       " plantarflexion 5/5 bilateral       inversion 5/5 bilateral       eversion 5/5 bilateral    Special Tests:       positive (+) anterior drawer left       neg (-) talar tilt left       neg (-) external rotation testing left    Gait:       normal    Proprioception       limited with single leg stance on the left    Neurovascular:       2+ peripheral pulses bilaterally and brisk capillary refill       sensation grossly intact      RADIOLOGY:  I independently ordered, visualized and reviewed these images with the patient  3 XR views of left ankle reviewed: no acute bony abnormality, no significant degenerative change  - will follow official read    Review of the result(s) of each unique test - XR

## 2022-06-11 ENCOUNTER — OFFICE VISIT (OUTPATIENT)
Dept: ORTHOPEDICS | Facility: CLINIC | Age: 37
End: 2022-06-11
Payer: COMMERCIAL

## 2022-06-11 VITALS
SYSTOLIC BLOOD PRESSURE: 97 MMHG | BODY MASS INDEX: 26.67 KG/M2 | WEIGHT: 180.6 LBS | DIASTOLIC BLOOD PRESSURE: 65 MMHG | HEART RATE: 71 BPM

## 2022-06-11 DIAGNOSIS — S99.912A INJURY OF LEFT ANKLE, INITIAL ENCOUNTER: Primary | ICD-10-CM

## 2022-06-11 DIAGNOSIS — S93.492D SPRAIN OF ANTERIOR TALOFIBULAR LIGAMENT OF LEFT ANKLE, SUBSEQUENT ENCOUNTER: ICD-10-CM

## 2022-06-11 PROCEDURE — 99203 OFFICE O/P NEW LOW 30 MIN: CPT | Performed by: PEDIATRICS

## 2022-06-11 NOTE — LETTER
6/11/2022         RE: Oanh Interiano  [address]  [place] [address]        Dear Colleague,    Thank you for referring your patient, Oanh Interiano, to the [facility] [place]. Please see a copy of my visit note below.    ASSESSMENT & PLAN    Oanh was seen today for pain.    Diagnoses and all orders for this visit:    Injury of left ankle, initial encounter  -     XR Ankle Left G/E 3 Views; Future  -     Physical Therapy Referral; Future  -     Ankle/Foot Bracing Supplies Order    Sprain of anterior talofibular ligament of left ankle, subsequent encounter  -     Physical Therapy Referral; Future  -     Ankle/Foot Bracing Supplies Order      This issue is acute and Unchanged.    Discussed the likely injuries associated with a lateral ankle sprain and typical treatment including rest from irritating activities and pain free weight bearing - ankle brace.  Patient will need a gradual rehabilitation program focusing on range of motion, strengthening and proprioception - formal physical therapy.  Discussed a gradual return to sports and activities once pain free, no swelling, full range of motion and full strength.  Follow up at this time is only as needed - if symptoms do not improve.  Recommended ankle brace use with activities for at least 6 months post injury.     Plan:  - Today's Plan of Care:  Consider Ankle Brace - Tri Lock    [phone]    Return to Sports Criteria : pain free, full range of motion and full strength  - Would then recommend very gradual return to activities with rest and follow up appointment if pain or symptoms return.    -We also discussed other future treatment options:  MR Left Ankle    Follow Up: 6 - 8 weeks    Concerning signs and symptoms were reviewed.  The patient expressed understanding of this management plan and all questions were answered at this time.    Patricia Frances MD [facility]  Sports Medicine  "Physician  [facility]      -----  Chief Complaint   Patient presents with     Left Ankle - Pain       SUBJECTIVE  Oanh Interiano is a/an 36 year old female who is seen as a self referral for evaluation of left ankle pain.     The patient is seen by themselves.    Onset: 3 month(s) ago. Patient describes injury as walking in heeled boots and left ankle \"gave out\" and inverted ankle.  Had swelling and pain immediately.  - Training for a triathlon, more symptoms with running than other activities.    Location of Pain: left lateral malleolus   Worsened by: inverting ankle   Better with: ankle brace   Treatments tried: rest/activity avoidance and ice  Associated symptoms: feeling of instability, swelling     Orthopedic/Surgical history: YES - chronic left ankle and foot pain/instability, saw Dr. Read 2019 for foot issues, will occasionally roll her ankle  Social History/Occupation:      No family history pertinent to patient's problem today.    REVIEW OF SYSTEMS:  Review of Systems  Skin: no bruising, yes swelling  Musculoskeletal: as above  Neurologic: no numbness, paresthesias  Remainder of review of systems is negative including constitutional, CV, pulmonary, GI, except as noted in HPI or medical history.    OBJECTIVE:  BP 97/65   Pulse 71   Wt 81.9 kg (180 lb 9.6 oz)   BMI 26.67 kg/m     General: healthy, alert and in no distress  HEENT: no scleral icterus or conjunctival erythema  Skin: no suspicious lesions or rash. No jaundice.  CV: distal perfusion intact  Resp: normal respiratory effort without conversational dyspnea   Psych: normal mood and affect  Gait: normal steady gait with appropriate coordination and balance  Neuro: Normal light sensory exam of lower extremity    Bilateral Foot and Ankle Exam:  Inspection:       edema noted mild left ankle    Foot inspection:       no deformity noted    Tender:       lateral ankle ligaments  left    Non-Tender:       remainder of " ankle and foot bilateral    ROM:        Full active and passive ROM, ankle dorsiflexion, plantarflexion, inversion, eversion, great toe dorsiflexion, remainder of toes, midfoot and subtalar bilateral    Strength:       ankle dorsiflexion 5/5 bilateral       plantarflexion 5/5 bilateral       inversion 5/5 bilateral       eversion 5/5 bilateral    Special Tests:       positive (+) anterior drawer left       neg (-) talar tilt left       neg (-) external rotation testing left    Gait:       normal    Proprioception       limited with single leg stance on the left    Neurovascular:       2+ peripheral pulses bilaterally and brisk capillary refill       sensation grossly intact      RADIOLOGY:  I independently ordered, visualized and reviewed these images with the patient  3 XR views of left ankle reviewed: no acute bony abnormality, no significant degenerative change  - will follow official read    Review of the result(s) of each unique test - XR               Again, thank you for allowing me to participate in the care of your patient.        Sincerely,        Patricia Frances MD

## 2022-06-11 NOTE — PATIENT INSTRUCTIONS
Discussed the likely injuries associated with a lateral ankle sprain and typical treatment including rest from irritating activities and pain free weight bearing - ankle brace.  Patient will need a gradual rehabilitation program focusing on range of motion, strengthening and proprioception - formal physical therapy.  Discussed a gradual return to sports and activities once pain free, no swelling, full range of motion and full strength.  Follow up at this time is only as needed - if symptoms do not improve.  Recommended ankle brace use with activities for at least 6 months post injury.     Plan:  - Today's Plan of Care:  Consider Ankle Brace - [facility]    Rehab: Physical Therapy: [facility] - [phone]    Return to Sports Criteria : pain free, full range of motion and full strength  - Would then recommend very gradual return to activities with rest and follow up appointment if pain or symptoms return.    -We also discussed other future treatment options:  MR Left Ankle    Follow Up: 6 - 8 weeks    If you have any further questions for your physician or physician s care team you can call [phone] and use option 3 to leave a voice message. Calls received during business hours will be returned same day.

## 2022-07-20 ENCOUNTER — THERAPY VISIT (OUTPATIENT)
Dept: PHYSICAL THERAPY | Facility: CLINIC | Age: 37
End: 2022-07-20
Attending: PEDIATRICS
Payer: COMMERCIAL

## 2022-07-20 DIAGNOSIS — S93.492D SPRAIN OF ANTERIOR TALOFIBULAR LIGAMENT OF LEFT ANKLE, SUBSEQUENT ENCOUNTER: Primary | ICD-10-CM

## 2022-07-20 DIAGNOSIS — S99.912A INJURY OF LEFT ANKLE, INITIAL ENCOUNTER: ICD-10-CM

## 2022-07-20 PROCEDURE — 97110 THERAPEUTIC EXERCISES: CPT | Mod: GP | Performed by: PHYSICAL THERAPIST

## 2022-07-20 PROCEDURE — 97161 PT EVAL LOW COMPLEX 20 MIN: CPT | Mod: GP | Performed by: PHYSICAL THERAPIST

## 2022-07-20 NOTE — PROGRESS NOTES
Physical Therapy Initial Evaluation  Subjective:    Patient Health History  Oanh Interiano being seen for Ankle PT.     Problem began: 7/17/2001.   Problem occurred: Playing high school soccer   Pain is reported as 1/10 on pain scale.  General health as reported by patient is fair.  Pertinent medical history includes: depression and overweight.   Red flags:  None as reported by patient.  Medical allergies: other. Other medical allergies details: Sulfa.   Surgeries include:  None.    Current medications:  Anti-depressants.    Current occupation is psychologist.   Primary job tasks include:  Computer work, prolonged sitting and repetitive tasks.                  Pt has a long history of ankle injury over the past 18-20years, over the years when she experiences an injury her L ankle will give out resulting in fall to the ground followed by a period of pain and swelling. She is training for her first triathlon in august.                    L ankle xray 6/11/22:    IMPRESSION: Normal joint spaces and alignment. No fracture. Small unfused os trigonum. Incidental small bone island in the calcaneus. Mild lateral ankle soft tissue swelling.    Objective:      ANKLE:     PROM L PROM R AROM L AROM R MMT L MMT R   Dorsiflexion   4 10 5/5 5/5   Plantarflexion   62 65 5/5 5/5   Inversion   40 42 4/5 5/5   Eversion   12 12 4/5 5/5   G Toe Ext         G Toe Flex           Edema:  No swelling    Palpation: mild TTP L rita lateral and postero lateral ankler    Gait: B lateral pelvic drop on level, reciprocal pattern on ascent and descent with use of handrail on descent    Special Tests: all negative bilaterally   L R   Anterior Drawer     Posterior Drawer     Valgus Stress     Varus Stress     External Rotation     Delgadillo's (Achilles)     Annabella's                   System    Physical Exam    General     ROS    Assessment/Plan:    Patient is a 36 year old female with left side knee complaints.    Patient has the following  significant findings with corresponding treatment plan.                Diagnosis 1:  L ankle pain  Pain -  hot/cold therapy, US, manual therapy, splint/taping/bracing/orthotics, education and home program  Decreased ROM/flexibility - manual therapy, therapeutic exercise, therapeutic activity and home program  Decreased joint mobility - manual therapy, therapeutic exercise, therapeutic activity and home program  Decreased strength - therapeutic exercise, therapeutic activities and home program  Impaired gait - gait training and home program    Cumulative Therapy Evaluation is: Low complexity.    Previous and current functional limitations:  (See Goal Flow Sheet for this information)    Short term and Long term goals: (See Goal Flow Sheet for this information)     Communication ability:  Patient appears to be able to clearly communicate and understand verbal and written communication and follow directions correctly.  Treatment Explanation - The following has been discussed with the patient:   RX ordered/plan of care  Anticipated outcomes  Possible risks and side effects  This patient would benefit from PT intervention to resume normal activities.   Rehab potential is good.    Frequency:  1 X week, once daily  Duration:  for 10 weeks  Discharge Plan:  Achieve all LTG.  Independent in home treatment program.  Reach maximal therapeutic benefit.    Please refer to the daily flowsheet for treatment today, total treatment time and time spent performing 1:1 timed codes.

## 2022-08-01 ENCOUNTER — THERAPY VISIT (OUTPATIENT)
Dept: PHYSICAL THERAPY | Facility: CLINIC | Age: 37
End: 2022-08-01
Payer: COMMERCIAL

## 2022-08-01 DIAGNOSIS — S93.492D SPRAIN OF ANTERIOR TALOFIBULAR LIGAMENT OF LEFT ANKLE, SUBSEQUENT ENCOUNTER: Primary | ICD-10-CM

## 2022-08-01 PROCEDURE — 97110 THERAPEUTIC EXERCISES: CPT | Mod: GP | Performed by: PHYSICAL THERAPIST

## 2022-08-01 PROCEDURE — 97112 NEUROMUSCULAR REEDUCATION: CPT | Mod: GP | Performed by: PHYSICAL THERAPIST

## 2022-08-24 ENCOUNTER — THERAPY VISIT (OUTPATIENT)
Dept: PHYSICAL THERAPY | Facility: CLINIC | Age: 37
End: 2022-08-24
Payer: COMMERCIAL

## 2022-08-24 DIAGNOSIS — S93.492D SPRAIN OF ANTERIOR TALOFIBULAR LIGAMENT OF LEFT ANKLE, SUBSEQUENT ENCOUNTER: Primary | ICD-10-CM

## 2022-08-24 PROCEDURE — 97110 THERAPEUTIC EXERCISES: CPT | Mod: GP | Performed by: PHYSICAL THERAPIST

## 2022-08-28 ENCOUNTER — HEALTH MAINTENANCE LETTER (OUTPATIENT)
Age: 37
End: 2022-08-28

## 2022-09-16 ASSESSMENT — ENCOUNTER SYMPTOMS
EYE PAIN: 0
HEADACHES: 0
MYALGIAS: 0
ARTHRALGIAS: 0
FREQUENCY: 0
BREAST MASS: 0
NERVOUS/ANXIOUS: 0
HEARTBURN: 0
CONSTIPATION: 0
SORE THROAT: 0
PALPITATIONS: 0
DIARRHEA: 0
SHORTNESS OF BREATH: 0
CHILLS: 0
PARESTHESIAS: 0
JOINT SWELLING: 0
NAUSEA: 0
HEMATOCHEZIA: 0
FEVER: 0
DYSURIA: 0
COUGH: 0
HEMATURIA: 0
DIZZINESS: 0
ABDOMINAL PAIN: 0
WEAKNESS: 0

## 2022-09-23 ENCOUNTER — OFFICE VISIT (OUTPATIENT)
Dept: FAMILY MEDICINE | Facility: CLINIC | Age: 37
End: 2022-09-23
Payer: COMMERCIAL

## 2022-09-23 VITALS
WEIGHT: 181.8 LBS | SYSTOLIC BLOOD PRESSURE: 90 MMHG | RESPIRATION RATE: 15 BRPM | OXYGEN SATURATION: 100 % | HEART RATE: 71 BPM | DIASTOLIC BLOOD PRESSURE: 62 MMHG | BODY MASS INDEX: 26.93 KG/M2 | TEMPERATURE: 97 F | HEIGHT: 69 IN

## 2022-09-23 DIAGNOSIS — Z12.4 CERVICAL CANCER SCREENING: ICD-10-CM

## 2022-09-23 DIAGNOSIS — Z11.59 NEED FOR HEPATITIS C SCREENING TEST: ICD-10-CM

## 2022-09-23 DIAGNOSIS — F32.5 MAJOR DEPRESSIVE DISORDER WITH SINGLE EPISODE, IN FULL REMISSION (H): ICD-10-CM

## 2022-09-23 DIAGNOSIS — Z00.00 ROUTINE GENERAL MEDICAL EXAMINATION AT A HEALTH CARE FACILITY: Primary | ICD-10-CM

## 2022-09-23 LAB
CHOLEST SERPL-MCNC: 164 MG/DL
FASTING STATUS PATIENT QL REPORTED: YES
FASTING STATUS PATIENT QL REPORTED: YES
GLUCOSE BLD-MCNC: 91 MG/DL (ref 70–99)
HDLC SERPL-MCNC: 56 MG/DL
LDLC SERPL CALC-MCNC: 95 MG/DL
NONHDLC SERPL-MCNC: 108 MG/DL
TRIGL SERPL-MCNC: 66 MG/DL

## 2022-09-23 PROCEDURE — 99395 PREV VISIT EST AGE 18-39: CPT | Performed by: FAMILY MEDICINE

## 2022-09-23 PROCEDURE — 82947 ASSAY GLUCOSE BLOOD QUANT: CPT | Performed by: FAMILY MEDICINE

## 2022-09-23 PROCEDURE — 96127 BRIEF EMOTIONAL/BEHAV ASSMT: CPT | Performed by: FAMILY MEDICINE

## 2022-09-23 PROCEDURE — 36415 COLL VENOUS BLD VENIPUNCTURE: CPT | Performed by: FAMILY MEDICINE

## 2022-09-23 PROCEDURE — 86803 HEPATITIS C AB TEST: CPT | Performed by: FAMILY MEDICINE

## 2022-09-23 PROCEDURE — 99213 OFFICE O/P EST LOW 20 MIN: CPT | Mod: 25 | Performed by: FAMILY MEDICINE

## 2022-09-23 PROCEDURE — 87624 HPV HI-RISK TYP POOLED RSLT: CPT | Performed by: FAMILY MEDICINE

## 2022-09-23 PROCEDURE — 80061 LIPID PANEL: CPT | Performed by: FAMILY MEDICINE

## 2022-09-23 PROCEDURE — 87902 NFCT AGT GNTYP ALYS HEP C: CPT | Performed by: FAMILY MEDICINE

## 2022-09-23 PROCEDURE — G0145 SCR C/V CYTO,THINLAYER,RESCR: HCPCS | Performed by: FAMILY MEDICINE

## 2022-09-23 ASSESSMENT — PATIENT HEALTH QUESTIONNAIRE - PHQ9
SUM OF ALL RESPONSES TO PHQ QUESTIONS 1-9: 2
10. IF YOU CHECKED OFF ANY PROBLEMS, HOW DIFFICULT HAVE THESE PROBLEMS MADE IT FOR YOU TO DO YOUR WORK, TAKE CARE OF THINGS AT HOME, OR GET ALONG WITH OTHER PEOPLE: SOMEWHAT DIFFICULT
SUM OF ALL RESPONSES TO PHQ QUESTIONS 1-9: 2

## 2022-09-23 ASSESSMENT — ENCOUNTER SYMPTOMS
DYSURIA: 0
SORE THROAT: 0
HEARTBURN: 0
NERVOUS/ANXIOUS: 0
ABDOMINAL PAIN: 0
ARTHRALGIAS: 0
SHORTNESS OF BREATH: 0
FREQUENCY: 0
COUGH: 0
PALPITATIONS: 0
CONSTIPATION: 0
JOINT SWELLING: 0
DIZZINESS: 0
FEVER: 0
NAUSEA: 0
BREAST MASS: 0
MYALGIAS: 0
WEAKNESS: 0
HEMATURIA: 0
EYE PAIN: 0
DIARRHEA: 0
HEMATOCHEZIA: 0
CHILLS: 0
HEADACHES: 0
PARESTHESIAS: 0

## 2022-09-23 NOTE — PATIENT INSTRUCTIONS
Think of Nature as a multivitamin that you should take every day.  Make an effort to spend time outside every day.    If you spend jean in [place] vitamin D 400-100 daily is a good idea October-April.    Social connections are also like a multivitamin; they give us micro-boosts that keep us healthy and happy. Talk to the check out person in the store, connect with your neighbors.  Make an effort to maintain and sustain social connections in your life.    Food is Medicine. The MIND or Mediterranean diet are the best for heart and brain health as well as have a lower lifetime cancer risk.    Weight lifting exercise 2-3x per week is excellent for bone health and maintaining muscle mass, particularly if you are over 40.  It's also helpful in reducing anxiety and boosting mood.    Move your body every day (exercise!).  Exercise is the most effective way to boost mood, reduce anxiety and depression, reduce risks of many chronic diseases and age well.  Find something you enjoy and do it regularly (walk, run, take a dance class, join a gym, ski, roller-blade, get into yoga, learn matthew chi...)    Prioritize your sleep! Adults age 26-64 need 7-9 hours of sleep a night.  Older adults 65+ need 7-8 hours of sleep a night.  Studies show that people who sleep seven hours a night are healthier and live longer. Sleeping less than seven hours is associated with a range of health problems including obesity, dementia, heart disease, depression and impaired immune function.    Patient Education        I know it's been very difficult to get an appointment with me lately!  Here are a few suggestions to access care:    Schedule your annual visit at least 4-6 months ahead of when they are due.  Even better, schedule your appointment for next year on your way out.    For a more urgent issue* appointment - I often have availability for Video or Telephone Visits much sooner than in person.  You can schedule these on [facility] Schedule an  Appointment and we can take care of a lot of issues this way!  If you need labs, x-rays or immunizations we can always schedule those separately after our virtual visit.  (*I can do annual medicare wellness visits virtually, but not other yearly adult or pediatric preventative exams or well child checks).    Finally, you can reach me via an E-Visit (similar to e-mail exchange) on weekdays, and I typically will get back to you within 24 hours.  E-visits are perfect for fairly simple issues (I.e. urinary tract infection, bronchitis, COVID test) or medication adjustments or refills (I.e. blood pressure or anxiety medicine) that don't require a lot of discussion or explanation.  You can select this via [employer] under the Main Menu and select E-Visit (not schedule an appointment).         Preventive Health Recommendations  Female Ages 26 - 39  Yearly exam:   See your health care provider every year in order to  Review health changes.   Discuss preventive care.    Review your medicines if you your doctor has prescribed any.    Until age 30: Get a Pap test every three years (more often if you have had an abnormal result).    After age 30: Talk to your doctor about whether you should have a Pap test every 3 years or have a Pap test with HPV screening every 5 years.   You do not need a Pap test if your uterus was removed (hysterectomy) and you have not had cancer.  You should be tested each year for STDs (sexually transmitted diseases), if you're at risk.   Talk to your provider about how often to have your cholesterol checked.  If you are at risk for diabetes, you should have a diabetes test (fasting glucose).  Shots: Get a flu shot each year. Get a tetanus shot every 10 years.   Nutrition:   Eat at least 5 servings of fruits and vegetables each day.  Eat whole-grain bread, whole-wheat pasta and brown rice instead of white grains and rice.  Get adequate Calcium and Vitamin D.     Lifestyle  Exercise at least 150 minutes a  week (30 minutes a day, 5 days of the week). This will help you control your weight and prevent disease.  Limit alcohol to one drink per day.  No smoking.   Wear sunscreen to prevent skin cancer.  See your dentist every six months for an exam and cleaning.

## 2022-09-23 NOTE — PROGRESS NOTES
SUBJECTIVE:   CC: Oanh is an 37 year old who presents for preventive health visit.       Patient has been advised of split billing requirements and indicates understanding: Yes  Healthy Habits:     Getting at least 3 servings of Calcium per day:  Yes    Bi-annual eye exam:  NO    Dental care twice a year:  Yes    Sleep apnea or symptoms of sleep apnea:  None    Diet:  Regular (no restrictions)    Frequency of exercise:  2-3 days/week    Duration of exercise:  45-60 minutes    Taking medications regularly:  Yes    Medication side effects:  None    PHQ-2 Total Score: 0    Additional concerns today:  No    Sertraline last filled by gyn  Feels workign well  Exercising, did triathalon!  Kickboxing.    PHQ 6/15/2021 1/19/2022 9/23/2022   PHQ-9 Total Score 3 4 2   Q9: Thoughts of better off dead/self-harm past 2 weeks Not at all Not at all Not at all       Today's PHQ-2 Score:   PHQ-2 ( 1999 Pfizer) 9/16/2022   Q1: Little interest or pleasure in doing things 0   Q2: Feeling down, depressed or hopeless 0   PHQ-2 Score 0   PHQ-2 Total Score (12-17 Years)- Positive if 3 or more points; Administer PHQ-A if positive -   Q1: Little interest or pleasure in doing things Not at all   Q2: Feeling down, depressed or hopeless Not at all   PHQ-2 Score 0       Abuse: Current or Past (Physical, Sexual or Emotional) - No  Do you feel safe in your environment? Yes    Have you ever done Advance Care Planning? (For example, a Health Directive, POLST, or a discussion with a medical provider or your loved ones about your wishes): No, advance care planning information given to patient to review.  Patient declined advance care planning discussion at this time.    Social History     Tobacco Use     Smoking status: Never Smoker     Smokeless tobacco: Never Used   Substance Use Topics     Alcohol use: Yes     Comment: occ         Alcohol Use 9/16/2022   Prescreen: >3 drinks/day or >7 drinks/week? No   Prescreen: >3 drinks/day or >7 drinks/week?  -       Reviewed orders with patient.  Reviewed health maintenance and updated orders accordingly - Yes      Breast Cancer Screening:    FHS-7:   Breast CA Risk Assessment (FHS-7) 9/16/2022   Did any of your first-degree relatives have breast or ovarian cancer? No   Did any of your relatives have bilateral breast cancer? No   Did any man in your family have breast cancer? No   Did any woman in your family have breast and ovarian cancer? No   Did any woman in your family have breast cancer before age 50 y? No   Do you have 2 or more relatives with breast and/or ovarian cancer? No   Do you have 2 or more relatives with breast and/or bowel cancer? No       Patient under 40 years of age: Routine Mammogram Screening not recommended.   Pertinent mammograms are reviewed under the imaging tab.    History of abnormal Pap smear: YES - updated in Problem List and Health Maintenance accordingly  PAP / HPV Latest Ref Rng & Units 1/9/2018 7/16/2014 3/21/2013   PAP (Historical) - NIL NIL ASC-US(A)   HPV16 NEG:Negative Negative - -   HPV18 NEG:Negative Negative - -   HRHPV NEG:Negative Negative - -     Reviewed and updated as needed this visit by clinical staff   Tobacco  Allergies  Meds  Problems  Med Hx  Surg Hx  Fam Hx  Soc   Hx          Reviewed and updated as needed this visit by Provider     Meds  Problems                  Review of Systems   Constitutional: Negative for chills and fever.   HENT: Negative for congestion, ear pain, hearing loss and sore throat.    Eyes: Negative for pain and visual disturbance.   Respiratory: Negative for cough and shortness of breath.    Cardiovascular: Negative for chest pain, palpitations and peripheral edema.   Gastrointestinal: Negative for abdominal pain, constipation, diarrhea, heartburn, hematochezia and nausea.   Breasts:  Negative for tenderness, breast mass and discharge.   Genitourinary: Negative for dysuria, frequency, genital sores, hematuria, pelvic pain, urgency,  "vaginal bleeding and vaginal discharge.   Musculoskeletal: Negative for arthralgias, joint swelling and myalgias.   Skin: Negative for rash.   Neurological: Negative for dizziness, weakness, headaches and paresthesias.   Psychiatric/Behavioral: Negative for mood changes. The patient is not nervous/anxious.           OBJECTIVE:   BP 90/62 (BP Location: Right arm, Patient Position: Sitting, Cuff Size: Adult Large)   Pulse 71   Temp 97  F (36.1  C) (Temporal)   Resp 15   Ht 1.753 m (5' 9\")   Wt 82.5 kg (181 lb 12.8 oz)   LMP 09/02/2022 (Exact Date)   SpO2 100%   Breastfeeding No   BMI 26.85 kg/m    Physical Exam  GENERAL: healthy, alert and no distress  EYES: Eyes grossly normal to inspection, PERRL and conjunctivae and sclerae normal  HENT: ear canals and TM's normal, nose and mouth without ulcers or lesions  NECK: no adenopathy, no asymmetry, masses, or scars and thyroid normal to palpation  RESP: lungs clear to auscultation - no rales, rhonchi or wheezes  BREAST: normal without masses, tenderness or nipple discharge and no palpable axillary masses or adenopathy  CV: regular rate and rhythm, normal S1 S2, no S3 or S4, no murmur, click or rub, no peripheral edema and peripheral pulses strong  ABDOMEN: soft, nontender, no hepatosplenomegaly, no masses and bowel sounds normal   (female): normal female external genitalia, normal urethral meatus, vaginal mucosa pink, moist, well rugated, and normal cervix/adnexa/uterus without masses or discharge  MS: no gross musculoskeletal defects noted, no edema  SKIN: no suspicious lesions or rashes  NEURO: Normal strength and tone, mentation intact and speech normal  PSYCH: mentation appears normal, affect normal/bright    Diagnostic Test Results:  Labs reviewed in Epic    ASSESSMENT/PLAN:   Oanh was seen today for physical.    Diagnoses and all orders for this visit:    Routine general medical examination at a health care facility  Comments:  labs today  pap " "today  start mammo at 39  Orders:  -     Lipid panel reflex to direct LDL Fasting; Future  -     Glucose; Future    Major depressive disorder with single episode, in full remission (H)  Comments:  stable, doing well  PHQ9=0 today  continue exercise, healthy choices!  Follow up yearly  Refilled sertraline  Orders:  -     sertraline (ZOLOFT) 50 MG tablet; Take 1 tablet (50 mg) by mouth daily    Cervical cancer screening  -     Pap Screen with HPV - recommended age 30 - 65 years    Need for hepatitis C screening test  -     Hepatitis C Screen Reflex to HCV RNA Quant and Genotype; Future    Other orders  -     Cancel: INFLUENZA VACCINE IM > 6 MONTHS VALENT IIV4 (AFLURIA/FLUZONE)  -     REVIEW OF HEALTH MAINTENANCE PROTOCOL ORDERS  -     Cancel: COVID-19,PF,PFIZER BOOSTER BIVALENT (12+YRS)  -     PRIMARY CARE FOLLOW-UP SCHEDULING; Future        Patient has been advised of split billing requirements and indicates understanding: Yes    COUNSELING:  Reviewed preventive health counseling, as reflected in patient instructions    Estimated body mass index is 26.85 kg/m  as calculated from the following:    Height as of this encounter: 1.753 m (5' 9\").    Weight as of this encounter: 82.5 kg (181 lb 12.8 oz).    Weight management plan: Discussed healthy diet and exercise guidelines    She reports that she has never smoked. She has never used smokeless tobacco.      Counseling Resources:  ATP IV Guidelines  Pooled Cohorts Equation Calculator  Breast Cancer Risk Calculator  BRCA-Related Cancer Risk Assessment: FHS-7 Tool  FRAX Risk Assessment  ICSI Preventive Guidelines  Dietary Guidelines for Americans, 2010  USDA's MyPlate  ASA Prophylaxis  Lung CA Screening    Elif Beard MD  [facility]  Answers for HPI/ROS submitted by the patient on 9/23/2022  If you checked off any problems, how difficult have these problems made it for you to do your work, take care of things at home, or get along with " other people?: Somewhat difficult  PHQ9 TOTAL SCORE: 2

## 2022-09-26 LAB — HCV AB SERPL QL IA: REACTIVE

## 2022-09-27 LAB
BKR LAB AP GYN ADEQUACY: NORMAL
BKR LAB AP GYN INTERPRETATION: NORMAL
BKR LAB AP HPV REFLEX: NORMAL
BKR LAB AP PREVIOUS ABNORMAL: NORMAL
HCV RNA SERPL NAA+PROBE-ACNC: NOT DETECTED IU/ML
PATH REPORT.COMMENTS IMP SPEC: NORMAL
PATH REPORT.COMMENTS IMP SPEC: NORMAL
PATH REPORT.RELEVANT HX SPEC: NORMAL

## 2022-09-29 LAB
HUMAN PAPILLOMA VIRUS 16 DNA: NEGATIVE
HUMAN PAPILLOMA VIRUS 18 DNA: NEGATIVE
HUMAN PAPILLOMA VIRUS FINAL DIAGNOSIS: NORMAL
HUMAN PAPILLOMA VIRUS OTHER HR: NEGATIVE

## 2022-09-29 NOTE — RESULT ENCOUNTER NOTE
Good news Oanh - all of your tests including hepatitis C screen are normal.  Let me know if you have any questions.  Best,  Dr. Elif Beard MD/[facility]

## 2022-12-15 NOTE — PLAN OF CARE
VSS, assessment WDL. Breastfeeding independently, good latch observed. Up ad sergo, voiding freely. Pain managed with ibuprofen and tylenol. Support person present at bedside. Bonding well with baby. Will continue with plan of care.   Anesthesia Type: 1% lidocaine with epinephrine Anesthesia Volume In Cc: 3 Consent: Written consent obtained and the risks of skin tag removal was reviewed with the patient including but not limited to bleeding, pigmentary change, infection, pain, and remote possibility of scarring. Detail Level: Detailed Removed With: scissors

## 2022-12-30 ENCOUNTER — VIRTUAL VISIT (OUTPATIENT)
Dept: FAMILY MEDICINE | Facility: CLINIC | Age: 37
End: 2022-12-30
Payer: COMMERCIAL

## 2022-12-30 DIAGNOSIS — J18.9 COMMUNITY ACQUIRED PNEUMONIA, UNSPECIFIED LATERALITY: Primary | ICD-10-CM

## 2022-12-30 DIAGNOSIS — N76.0 ACUTE VAGINITIS: ICD-10-CM

## 2022-12-30 PROCEDURE — 99213 OFFICE O/P EST LOW 20 MIN: CPT | Mod: 95 | Performed by: PHYSICIAN ASSISTANT

## 2022-12-30 RX ORDER — FLUCONAZOLE 150 MG/1
150 TABLET ORAL ONCE
Qty: 1 TABLET | Refills: 0 | Status: SHIPPED | OUTPATIENT
Start: 2022-12-30 | End: 2022-12-30

## 2022-12-30 NOTE — PATIENT INSTRUCTIONS
Possible pneumonia, better to be seen in person, but likely limited urgent care options given holiday weekend  Decided to treat without in person visit   If worsening symptoms be seen right away

## 2022-12-30 NOTE — PROGRESS NOTES
Oanh is a 37 year old who is being evaluated via a billable video visit.      How would you like to obtain your AVS? MyChart  If the video visit is dropped, the invitation should be resent by: Text to cell phone: [phone]  Will anyone else be joining your video visit? No    Assessment & Plan     possible community acquired pneumonia, unspecified laterality  Treat  - amoxicillin-clavulanate (AUGMENTIN) 875-125 MG tablet  Dispense: 14 tablet; Refill: 0    Acute vaginitis  Treat  - fluconazole (DIFLUCAN) 150 MG tablet  Dispense: 1 tablet; Refill: 0     Patient Instructions   Possible pneumonia, better to be seen in person, but likely limited urgent care options given holiday weekend  Decided to treat without in person visit   If worsening symptoms be seen right away        Return if symptoms worsen or fail to improve.    [facility]    Subjective   Oanh is a 37 year old, presenting for the following health issues:  No chief complaint on file.      HPI     Acute Illness  Acute illness concerns:   Onset/Duration: 5 days ago   Symptoms:  Fever: No  Chills/Sweats: YES  Headache (location?): YES  Sinus Pressure: YES  Conjunctivitis:  No  Ear Pain: no  Rhinorrhea: No  Congestion: No  Sore Throat: YES  Cough: YES  Wheeze: YES  Decreased Appetite: No  Nausea: No  Vomiting: No  Diarrhea: No  Dysuria/Freq.: No  Dysuria or Hematuria: No  Fatigue/Achiness: YES- body aches and aches   Sick/Strep Exposure: yes nephew at [place] was sick   Therapies tried and outcome: tylenol and ibuprofen     Symptoms started 5-6 days ago but have progressively gotten worse.  Started as a bit of phlegm in throat and coughing.  Now feels like phlegm is in her lungs.  Burn in chest during coughing 2 days ago.  Now ache in chest/back - mostly mid to upper center chest.  Somewhat hard to breathe worse than usual cough/colds and can feel/hear a crackling.  Coughing really hard, productive.   Each day getting more tired.  Laid in bed all day.  Feels like how she felt with walking pneumonia in her late teens.  Never smoker.  Illness has been going through the family - bad cough but others have resolved.      Objective           Vitals:  No vitals were obtained today due to virtual visit.          Video-Visit Details    Type of service:  Video Visit     Originating Location (pt. Location): Home  Distant Location (provider location):  On-site  Platform used for Video Visit: Ian

## 2023-01-14 ENCOUNTER — HEALTH MAINTENANCE LETTER (OUTPATIENT)
Age: 38
End: 2023-01-14

## 2023-03-02 ENCOUNTER — MYC MEDICAL ADVICE (OUTPATIENT)
Dept: FAMILY MEDICINE | Facility: CLINIC | Age: 38
End: 2023-03-02
Payer: COMMERCIAL

## 2023-04-21 ENCOUNTER — OFFICE VISIT (OUTPATIENT)
Dept: URGENT CARE | Facility: URGENT CARE | Age: 38
End: 2023-04-21
Payer: COMMERCIAL

## 2023-04-21 VITALS
OXYGEN SATURATION: 99 % | TEMPERATURE: 99.1 F | WEIGHT: 171.4 LBS | RESPIRATION RATE: 10 BRPM | BODY MASS INDEX: 25.31 KG/M2 | SYSTOLIC BLOOD PRESSURE: 119 MMHG | HEART RATE: 73 BPM | DIASTOLIC BLOOD PRESSURE: 68 MMHG

## 2023-04-21 DIAGNOSIS — J02.9 SORE THROAT: Primary | ICD-10-CM

## 2023-04-21 LAB
BASOPHILS # BLD AUTO: 0.1 10E3/UL (ref 0–0.2)
BASOPHILS NFR BLD AUTO: 1 %
EOSINOPHIL # BLD AUTO: 0.1 10E3/UL (ref 0–0.7)
EOSINOPHIL NFR BLD AUTO: 1 %
ERYTHROCYTE [DISTWIDTH] IN BLOOD BY AUTOMATED COUNT: 12.7 % (ref 10–15)
HCT VFR BLD AUTO: 43.4 % (ref 35–47)
HGB BLD-MCNC: 14.3 G/DL (ref 11.7–15.7)
LYMPHOCYTES # BLD AUTO: 2.1 10E3/UL (ref 0.8–5.3)
LYMPHOCYTES NFR BLD AUTO: 21 %
MCH RBC QN AUTO: 31.6 PG (ref 26.5–33)
MCHC RBC AUTO-ENTMCNC: 32.9 G/DL (ref 31.5–36.5)
MCV RBC AUTO: 96 FL (ref 78–100)
MONOCYTES # BLD AUTO: 0.8 10E3/UL (ref 0–1.3)
MONOCYTES NFR BLD AUTO: 8 %
MONOCYTES NFR BLD AUTO: NEGATIVE %
NEUTROPHILS # BLD AUTO: 7.2 10E3/UL (ref 1.6–8.3)
NEUTROPHILS NFR BLD AUTO: 70 %
PLATELET # BLD AUTO: 331 10E3/UL (ref 150–450)
RBC # BLD AUTO: 4.53 10E6/UL (ref 3.8–5.2)
WBC # BLD AUTO: 10.2 10E3/UL (ref 4–11)

## 2023-04-21 PROCEDURE — 85025 COMPLETE CBC W/AUTO DIFF WBC: CPT | Performed by: PHYSICIAN ASSISTANT

## 2023-04-21 PROCEDURE — 99213 OFFICE O/P EST LOW 20 MIN: CPT | Performed by: PHYSICIAN ASSISTANT

## 2023-04-21 PROCEDURE — 36415 COLL VENOUS BLD VENIPUNCTURE: CPT | Performed by: PHYSICIAN ASSISTANT

## 2023-04-21 PROCEDURE — 86308 HETEROPHILE ANTIBODY SCREEN: CPT | Performed by: PHYSICIAN ASSISTANT

## 2023-04-21 ASSESSMENT — ENCOUNTER SYMPTOMS
VOMITING: 0
DIARRHEA: 0
FATIGUE: 1
COUGH: 1
APPETITE CHANGE: 1
RHINORRHEA: 1
SORE THROAT: 1
JOINT SWELLING: 0
NAUSEA: 1
HEADACHES: 1

## 2023-04-21 NOTE — LETTER
April 21, 2023      Oanh Interiano  [id number] [address] [id number]        To Whom It May Concern:    Oanh Interiano was seen in our clinic today and may return to work on 4/22/23.      Sincerely,        Elena Cheng

## 2023-04-21 NOTE — PROGRESS NOTES
SUBJECTIVE:   Oanh Interiano is a 37 year old female presenting with a chief complaint of   Chief Complaint   Patient presents with     Pharyngitis     Sore throat, swollen glands, fatigue, fever off and on for a couple weeks   Took 10 days of penicillin- positive strep at [facility]. [place] like she was getting better while she was taking the penicillin but once she finished it she felt sick again.        She is an established patient of [facility].  Patient presents with 3 weeks of ST.  Patient treated for strep and finished abx 10 days ago.  States improved but not 100 %.  Now presents with Tmax 101, fatigue.  No hx of mono.  Negative covid test.    Treatment:  Tylenol - last took 1 hour ago.      Review of Systems   Constitutional: Positive for appetite change and fatigue.   HENT: Positive for congestion, rhinorrhea and sore throat. Negative for ear pain.    Respiratory: Positive for cough.    Gastrointestinal: Positive for nausea. Negative for diarrhea and vomiting.   Musculoskeletal: Negative for joint swelling.   Skin: Negative for rash.   Neurological: Positive for headaches.   All other systems reviewed and are negative.      Past Medical History:   Diagnosis Date     ASCUS with positive high risk HPV 03/2013    + HPV 59, 62     Depressive disorder Age 14     Papanicolaou smear of cervix with low grade squamous intraepithelial lesion (LGSIL) 09/2010    colp WNL     Family History   Problem Relation Age of Onset     Thyroid Disease Mother         hypothyroidism     Circulatory Mother      Depression Mother      Neurologic Disorder Mother      Psychotic Disorder Mother      Skin Cancer Mother      Thyroid Disease Sister      Depression Sister      Hypertension Father      Lipids Father      Diabetes Paternal Grandmother      Cerebrovascular Disease Paternal Grandmother      Alcohol/Drug Paternal Grandmother      Cerebrovascular Disease Paternal Grandfather      Alcohol/Drug Paternal Grandfather       Circulatory Paternal Grandfather      Eye Disorder Paternal Grandfather      Hypertension Paternal Grandfather      Cancer - colorectal Maternal Grandfather      Colon Cancer Maternal Grandfather      Allergies Sister      Abdominal Aortic Aneurysm Sister      Thyroid Disease Sister         thyroidectomy     Depression Sister      Alzheimer Disease Maternal Grandmother      Depression Sister      Anxiety Disorder Sister      Thyroid Disease Sister      Asthma Sister      Thyroid Disease Sister      Current Outpatient Medications   Medication Sig Dispense Refill     sertraline (ZOLOFT) 50 MG tablet Take 1 tablet (50 mg) by mouth daily 90 tablet 3     amoxicillin-clavulanate (AUGMENTIN) 875-125 MG tablet Take 1 tablet by mouth 2 times daily (Patient not taking: Reported on 4/21/2023) 14 tablet 0     Social History     Tobacco Use     Smoking status: Never     Smokeless tobacco: Never   Vaping Use     Vaping status: Never Used   Substance Use Topics     Alcohol use: Yes     Comment: occ       OBJECTIVE  /68   Pulse 73   Temp 99.1  F (37.3  C) (Tympanic)   Resp 10   Wt 77.7 kg (171 lb 6.4 oz)   SpO2 99%   BMI 25.31 kg/m      Physical Exam  Vitals and nursing note reviewed.   Constitutional:       Appearance: Normal appearance. She is normal weight.   HENT:      Head: Normocephalic and atraumatic.      Right Ear: Tympanic membrane, ear canal and external ear normal.      Left Ear: Tympanic membrane, ear canal and external ear normal.      Nose: Nose normal.      Mouth/Throat:      Mouth: Mucous membranes are moist.      Pharynx: Oropharynx is clear. Posterior oropharyngeal erythema present.   Eyes:      Extraocular Movements: Extraocular movements intact.      Conjunctiva/sclera: Conjunctivae normal.   Cardiovascular:      Rate and Rhythm: Normal rate and regular rhythm.      Pulses: Normal pulses.      Heart sounds: Normal heart sounds.   Pulmonary:      Effort: Pulmonary effort is normal.      Breath  sounds: Normal breath sounds.   Musculoskeletal:      Cervical back: Normal range of motion. No rigidity.   Lymphadenopathy:      Cervical: Cervical adenopathy present.   Skin:     General: Skin is warm and dry.      Findings: No rash.   Neurological:      General: No focal deficit present.      Mental Status: She is alert.   Psychiatric:         Mood and Affect: Mood normal.         Behavior: Behavior normal.         Labs:  Results for orders placed or performed in visit on 04/21/23 (from the past 24 hour(s))   CBC with platelets and differential    Narrative    The following orders were created for panel order CBC with platelets and differential.  Procedure                               Abnormality         Status                     ---------                               -----------         ------                     CBC with platelets and d...[[id number]]                      Final result                 Please view results for these tests on the individual orders.   Mononucleosis screen   Result Value Ref Range    Mononucleosis Screen Negative Negative   CBC with platelets and differential   Result Value Ref Range    WBC Count 10.2 4.0 - 11.0 10e3/uL    RBC Count 4.53 3.80 - 5.20 10e6/uL    Hemoglobin 14.3 11.7 - 15.7 g/dL    Hematocrit 43.4 35.0 - 47.0 %    MCV 96 78 - 100 fL    MCH 31.6 26.5 - 33.0 pg    MCHC 32.9 31.5 - 36.5 g/dL    RDW 12.7 10.0 - 15.0 %    Platelet Count 331 150 - 450 10e3/uL    % Neutrophils 70 %    % Lymphocytes 21 %    % Monocytes 8 %    % Eosinophils 1 %    % Basophils 1 %    Absolute Neutrophils 7.2 1.6 - 8.3 10e3/uL    Absolute Lymphocytes 2.1 0.8 - 5.3 10e3/uL    Absolute Monocytes 0.8 0.0 - 1.3 10e3/uL    Absolute Eosinophils 0.1 0.0 - 0.7 10e3/uL    Absolute Basophils 0.1 0.0 - 0.2 10e3/uL       X-Ray was not done.    ASSESSMENT:      ICD-10-CM    1. Sore throat  J02.9 CBC with platelets and differential     Mononucleosis screen     CBC with platelets and differential      Mononucleosis screen           Medical Decision Making:    Differential Diagnosis:  URI Adult/Peds:  Mononucleosis, Strep pharyngitis, Viral pharyngitis and Viral syndrome    Serious Comorbid Conditions:  Adult:  reviewed    PLAN:    Tylenol/motrin as needed.  Drink plenty of water.  Gargle with salt water.  May use chloraseptic spray as directed for ST.  Discussed reasons to seek immediate medical attention.    Note for work.      Followup:    If not improving or if condition worsens, follow up with your Primary Care Provider, If not improving or if conditions worsens over the next 12-24 hours, go to the Emergency Department    There are no Patient Instructions on file for this visit.

## 2023-07-12 ENCOUNTER — VIRTUAL VISIT (OUTPATIENT)
Dept: FAMILY MEDICINE | Facility: CLINIC | Age: 38
End: 2023-07-12
Payer: COMMERCIAL

## 2023-07-12 DIAGNOSIS — F40.248 FEAR OF PUBLIC SPEAKING: Primary | ICD-10-CM

## 2023-07-12 PROCEDURE — 99214 OFFICE O/P EST MOD 30 MIN: CPT | Mod: VID | Performed by: FAMILY MEDICINE

## 2023-07-12 RX ORDER — PROPRANOLOL HYDROCHLORIDE 10 MG/1
10-40 TABLET ORAL DAILY PRN
Qty: 30 TABLET | Refills: 0 | Status: SHIPPED | OUTPATIENT
Start: 2023-07-12

## 2023-07-12 ASSESSMENT — PATIENT HEALTH QUESTIONNAIRE - PHQ9
SUM OF ALL RESPONSES TO PHQ QUESTIONS 1-9: 5
10. IF YOU CHECKED OFF ANY PROBLEMS, HOW DIFFICULT HAVE THESE PROBLEMS MADE IT FOR YOU TO DO YOUR WORK, TAKE CARE OF THINGS AT HOME, OR GET ALONG WITH OTHER PEOPLE: SOMEWHAT DIFFICULT
SUM OF ALL RESPONSES TO PHQ QUESTIONS 1-9: 5

## 2023-07-12 ASSESSMENT — ANXIETY QUESTIONNAIRES
1. FEELING NERVOUS, ANXIOUS, OR ON EDGE: NOT AT ALL
3. WORRYING TOO MUCH ABOUT DIFFERENT THINGS: NOT AT ALL
2. NOT BEING ABLE TO STOP OR CONTROL WORRYING: NOT AT ALL
GAD7 TOTAL SCORE: 2
4. TROUBLE RELAXING: SEVERAL DAYS
6. BECOMING EASILY ANNOYED OR IRRITABLE: SEVERAL DAYS
5. BEING SO RESTLESS THAT IT IS HARD TO SIT STILL: NOT AT ALL
GAD7 TOTAL SCORE: 2
IF YOU CHECKED OFF ANY PROBLEMS ON THIS QUESTIONNAIRE, HOW DIFFICULT HAVE THESE PROBLEMS MADE IT FOR YOU TO DO YOUR WORK, TAKE CARE OF THINGS AT HOME, OR GET ALONG WITH OTHER PEOPLE: SOMEWHAT DIFFICULT
7. FEELING AFRAID AS IF SOMETHING AWFUL MIGHT HAPPEN: NOT AT ALL

## 2023-07-12 NOTE — PROGRESS NOTES
"Oanh is a 37 year old who is being evaluated via a billable video visit.      How would you like to obtain your AVS? MyChart  If the video visit is dropped, the invitation should be resent by: Text to cell phone: [phone]  Will anyone else be joining your video visit? No        Assessment & Plan     Fear of public speaking    We will start propranolol 10 mg 30 to 60 minutes before public speaking    If ineffective and does not have dizziness can increase carefully to up to 40 mg daily as needed 30 to 60 minutes before public speaking    Discussed risks and benefits and side effects and how medication works    Let us know what dose seems to be right, and if any problems or issues.  - propranolol (INDERAL) 10 MG tablet; Take 1-4 tablets (10-40 mg) by mouth daily as needed (30-60  minutes before public speaking as needed)       BMI:   Estimated body mass index is 25.31 kg/m  as calculated from the following:    Height as of 9/23/22: 1.753 m (5' 9\").    Weight as of 4/21/23: 77.7 kg (171 lb 6.4 oz).       Elif Beard MD  [facility]   Oanh is a 37 year old, presenting for the following health issues:  Recheck Medication    History of Present Illness       Reason for visit:  Anti-anxiety medication for public speaking    She eats 2-3 servings of fruits and vegetables daily.She consumes 0 sweetened beverage(s) daily.She exercises with enough effort to increase her heart rate 20 to 29 minutes per day.  She exercises with enough effort to increase her heart rate 3 or less days per week. She is missing 1 dose(s) of medications per week.  She is not taking prescribed medications regularly due to remembering to take.    Today's PHQ-9         PHQ-9 Total Score: 5    PHQ-9 Q9 Thoughts of better off dead/self-harm past 2 weeks :   Not at all    How difficult have these problems made it for you to do your work, take care of things at home, or get along with other people: " Somewhat difficult  Today's BILL-7 Score: 2       Was hoping to talk about a medication for public speaking.  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  Now having a lot of public speaking anxiety.  Will help that words come out understandably.      Review of Systems   Constitutional, HEENT, cardiovascular, pulmonary, gi and gu systems are negative, except as otherwise noted.      Objective    Vitals - Patient Reported  Pain Score: Severe Pain (6)  Pain Loc: Hip        Physical Exam   GENERAL: Healthy, alert and no distress  EYES: Eyes grossly normal to inspection.  No discharge or erythema, or obvious scleral/conjunctival abnormalities.  RESP: No audible wheeze, cough, or visible cyanosis.  No visible retractions or increased work of breathing.    SKIN: Visible skin clear. No significant rash, abnormal pigmentation or lesions.  NEURO: Cranial nerves grossly intact.  Mentation and speech appropriate for age.  PSYCH: Mentation appears normal, affect normal/bright, judgement and insight intact, normal speech and appearance well-groomed.                Video-Visit Details    Type of service:  Video Visit     Originating Location (pt. Location): Home  Distant Location (provider location):  On-site  Platform used for Video Visit: Ian

## 2023-08-01 ENCOUNTER — VIRTUAL VISIT (OUTPATIENT)
Dept: FAMILY MEDICINE | Facility: CLINIC | Age: 38
End: 2023-08-01
Payer: COMMERCIAL

## 2023-08-01 DIAGNOSIS — R53.83 OTHER FATIGUE: Primary | ICD-10-CM

## 2023-08-01 PROCEDURE — 99213 OFFICE O/P EST LOW 20 MIN: CPT | Mod: VID | Performed by: FAMILY MEDICINE

## 2023-08-01 NOTE — PROGRESS NOTES
Oanh is a 37 year old who is being evaluated via a billable video visit.      How would you like to obtain your AVS? MyChart  If the video visit is dropped, the invitation should be resent by: Text to cell phone: [phone]  Will anyone else be joining your video visit? No          Assessment & Plan     Other fatigue  Wishes to have thyroid levels checked. Strong family history of thyroid disease. Order placed. She will schedule lab only visit to have lab draw.   - TSH with free T4 reflex      The risks, benefits and treatment options of prescribed medications or other treatments have been discussed with the patient. The patient verbalized their understanding and should call or follow up if no improvement or if they develop further problems.        [facility]   Oanh is a 37 year old, presenting for the following health issues:  Thyroid Problem        8/1/2023     4:33 PM   Additional Questions   Roomed by Monique SHEN       History of Present Illness       Reason for visit:  Wondering about thyroid function  Symptom onset:  More than a month  Symptoms include:  Tired all the time, cold, achey joints  Symptom intensity:  Moderate  Symptom progression:  Staying the same  Had these symptoms before:  No  What makes it worse:  No  What makes it better:  Tylenol    She eats 2-3 servings of fruits and vegetables daily.She consumes 0 sweetened beverage(s) daily.She exercises with enough effort to increase her heart rate 20 to 29 minutes per day.  She exercises with enough effort to increase her heart rate 3 or less days per week. She is missing 2 dose(s) of medications per week.  She is not taking prescribed medications regularly due to remembering to take.       Wishes to have thyroid function tested  Has been quite tired over the last 2 months.   Has a strong family history of thyroid issues.   Both sisters and mom take thyroid medications.     Very busy with being a  mom and young children and wonders if this could be the culprit. As well.       Review of Systems   Constitutional, HEENT, cardiovascular, pulmonary, gi and gu systems are negative, except as otherwise noted.      Objective    Vitals - Patient Reported  Pain Score: No Pain (0)        Physical Exam   GENERAL: Healthy, alert and no distress  EYES: Eyes grossly normal to inspection.  No discharge or erythema, or obvious scleral/conjunctival abnormalities.  RESP: No audible wheeze, cough, or visible cyanosis.  No visible retractions or increased work of breathing.    SKIN: Visible skin clear. No significant rash, abnormal pigmentation or lesions.  NEURO: Cranial nerves grossly intact.  Mentation and speech appropriate for age.  PSYCH: Mentation appears normal, affect normal/bright, judgement and insight intact, normal speech and appearance well-groomed.      Video-Visit Details    Type of service:  Video Visit     Originating Location (pt. Location): Home    Distant Location (provider location):  On-site  Platform used for Video Visit: Endocyte    Video time 6 mins.

## 2023-11-28 ENCOUNTER — MYC MEDICAL ADVICE (OUTPATIENT)
Dept: FAMILY MEDICINE | Facility: CLINIC | Age: 38
End: 2023-11-28
Payer: COMMERCIAL

## 2023-11-28 DIAGNOSIS — K64.4 EXTERNAL HEMORRHOIDS: Primary | ICD-10-CM

## 2023-11-30 NOTE — TELEPHONE ENCOUNTER
Writer responded via Mochila.  CIRO BroN, RN-BC  [facility]

## 2023-11-30 NOTE — TELEPHONE ENCOUNTER
Does she just need a referral to colorectal for hemorrhoid treatment?  If so, we can place that referral without a visit.  I'll do that now.    Otherwise, if she has other things she'd like to discuss then yes, it's okay to switch her appointment with me to a virtual visit.    Dr. Elif Beard MD / [facility]

## 2023-12-09 ENCOUNTER — HEALTH MAINTENANCE LETTER (OUTPATIENT)
Age: 38
End: 2023-12-09

## 2024-03-08 ENCOUNTER — OFFICE VISIT (OUTPATIENT)
Dept: FAMILY MEDICINE | Facility: CLINIC | Age: 39
End: 2024-03-08
Payer: COMMERCIAL

## 2024-03-08 VITALS
HEIGHT: 69 IN | BODY MASS INDEX: 25.92 KG/M2 | OXYGEN SATURATION: 98 % | SYSTOLIC BLOOD PRESSURE: 98 MMHG | HEART RATE: 83 BPM | DIASTOLIC BLOOD PRESSURE: 62 MMHG | RESPIRATION RATE: 16 BRPM | WEIGHT: 175 LBS | TEMPERATURE: 98 F

## 2024-03-08 DIAGNOSIS — R21 RASH: ICD-10-CM

## 2024-03-08 DIAGNOSIS — R53.83 FATIGUE, UNSPECIFIED TYPE: ICD-10-CM

## 2024-03-08 DIAGNOSIS — N92.6 IRREGULAR MENSES: Primary | ICD-10-CM

## 2024-03-08 DIAGNOSIS — K13.0 ANGULAR CHEILITIS: ICD-10-CM

## 2024-03-08 LAB
ALBUMIN SERPL BCG-MCNC: 4.3 G/DL (ref 3.5–5.2)
ALP SERPL-CCNC: 51 U/L (ref 40–150)
ALT SERPL W P-5'-P-CCNC: 11 U/L (ref 0–50)
ANION GAP SERPL CALCULATED.3IONS-SCNC: 8 MMOL/L (ref 7–15)
AST SERPL W P-5'-P-CCNC: 20 U/L (ref 0–45)
BILIRUB SERPL-MCNC: 0.7 MG/DL
BUN SERPL-MCNC: 13.7 MG/DL (ref 6–20)
CALCIUM SERPL-MCNC: 9.2 MG/DL (ref 8.6–10)
CHLORIDE SERPL-SCNC: 103 MMOL/L (ref 98–107)
CREAT SERPL-MCNC: 0.96 MG/DL (ref 0.51–0.95)
CRP SERPL-MCNC: <3 MG/L
DEPRECATED HCO3 PLAS-SCNC: 27 MMOL/L (ref 22–29)
EGFRCR SERPLBLD CKD-EPI 2021: 77 ML/MIN/1.73M2
ERYTHROCYTE [DISTWIDTH] IN BLOOD BY AUTOMATED COUNT: 12.6 % (ref 10–15)
ERYTHROCYTE [SEDIMENTATION RATE] IN BLOOD BY WESTERGREN METHOD: 10 MM/HR (ref 0–20)
ESTRADIOL SERPL-MCNC: 123 PG/ML
FSH SERPL IRP2-ACNC: 4.4 MIU/ML
GLUCOSE SERPL-MCNC: 93 MG/DL (ref 70–99)
HCT VFR BLD AUTO: 43.9 % (ref 35–47)
HGB BLD-MCNC: 14.5 G/DL (ref 11.7–15.7)
LH SERPL-ACNC: 7 MIU/ML
MCH RBC QN AUTO: 31.5 PG (ref 26.5–33)
MCHC RBC AUTO-ENTMCNC: 33 G/DL (ref 31.5–36.5)
MCV RBC AUTO: 95 FL (ref 78–100)
PLATELET # BLD AUTO: 292 10E3/UL (ref 150–450)
POTASSIUM SERPL-SCNC: 4.2 MMOL/L (ref 3.4–5.3)
PROLACTIN SERPL 3RD IS-MCNC: 8 NG/ML (ref 5–23)
PROT SERPL-MCNC: 7 G/DL (ref 6.4–8.3)
RBC # BLD AUTO: 4.6 10E6/UL (ref 3.8–5.2)
SODIUM SERPL-SCNC: 138 MMOL/L (ref 135–145)
T4 FREE SERPL-MCNC: 1.19 NG/DL (ref 0.9–1.7)
TSH SERPL DL<=0.005 MIU/L-ACNC: 2.45 UIU/ML (ref 0.3–4.2)
WBC # BLD AUTO: 5.4 10E3/UL (ref 4–11)

## 2024-03-08 PROCEDURE — 84403 ASSAY OF TOTAL TESTOSTERONE: CPT | Performed by: PHYSICIAN ASSISTANT

## 2024-03-08 PROCEDURE — 83001 ASSAY OF GONADOTROPIN (FSH): CPT | Performed by: PHYSICIAN ASSISTANT

## 2024-03-08 PROCEDURE — 86140 C-REACTIVE PROTEIN: CPT | Performed by: PHYSICIAN ASSISTANT

## 2024-03-08 PROCEDURE — 84443 ASSAY THYROID STIM HORMONE: CPT | Performed by: PHYSICIAN ASSISTANT

## 2024-03-08 PROCEDURE — 99214 OFFICE O/P EST MOD 30 MIN: CPT | Performed by: PHYSICIAN ASSISTANT

## 2024-03-08 PROCEDURE — 86376 MICROSOMAL ANTIBODY EACH: CPT | Performed by: PHYSICIAN ASSISTANT

## 2024-03-08 PROCEDURE — 85652 RBC SED RATE AUTOMATED: CPT | Performed by: PHYSICIAN ASSISTANT

## 2024-03-08 PROCEDURE — 86038 ANTINUCLEAR ANTIBODIES: CPT | Performed by: PHYSICIAN ASSISTANT

## 2024-03-08 PROCEDURE — 85027 COMPLETE CBC AUTOMATED: CPT | Performed by: PHYSICIAN ASSISTANT

## 2024-03-08 PROCEDURE — 80053 COMPREHEN METABOLIC PANEL: CPT | Performed by: PHYSICIAN ASSISTANT

## 2024-03-08 PROCEDURE — 82670 ASSAY OF TOTAL ESTRADIOL: CPT | Performed by: PHYSICIAN ASSISTANT

## 2024-03-08 PROCEDURE — 84439 ASSAY OF FREE THYROXINE: CPT | Performed by: PHYSICIAN ASSISTANT

## 2024-03-08 PROCEDURE — 86039 ANTINUCLEAR ANTIBODIES (ANA): CPT | Performed by: PHYSICIAN ASSISTANT

## 2024-03-08 PROCEDURE — 84146 ASSAY OF PROLACTIN: CPT | Performed by: PHYSICIAN ASSISTANT

## 2024-03-08 PROCEDURE — 36415 COLL VENOUS BLD VENIPUNCTURE: CPT | Performed by: PHYSICIAN ASSISTANT

## 2024-03-08 PROCEDURE — 83002 ASSAY OF GONADOTROPIN (LH): CPT | Performed by: PHYSICIAN ASSISTANT

## 2024-03-08 RX ORDER — ALBUTEROL SULFATE 90 UG/1
2 AEROSOL, METERED RESPIRATORY (INHALATION) EVERY 4 HOURS PRN
COMMUNITY
Start: 2024-01-26

## 2024-03-08 ASSESSMENT — PATIENT HEALTH QUESTIONNAIRE - PHQ9
10. IF YOU CHECKED OFF ANY PROBLEMS, HOW DIFFICULT HAVE THESE PROBLEMS MADE IT FOR YOU TO DO YOUR WORK, TAKE CARE OF THINGS AT HOME, OR GET ALONG WITH OTHER PEOPLE: SOMEWHAT DIFFICULT
SUM OF ALL RESPONSES TO PHQ QUESTIONS 1-9: 7
SUM OF ALL RESPONSES TO PHQ QUESTIONS 1-9: 7

## 2024-03-08 ASSESSMENT — PAIN SCALES - GENERAL: PAINLEVEL: NO PAIN (1)

## 2024-03-08 NOTE — PROGRESS NOTES
"    Assessment & Plan     Irregular menses  - TSH; Future  - T4, free; Future  - Thyroid peroxidase antibody; Future  - Testosterone, total; Future  - Follicle stimulating hormone; Future  - Luteinizing Hormone; Future  - Estradiol; Future  - Prolactin; Future  - TSH  - T4, free  - Thyroid peroxidase antibody  - Testosterone, total  - Follicle stimulating hormone  - Luteinizing Hormone  - Estradiol  - Prolactin    Rash  - Anti Nuclear Temitope IgG by IFA with Reflex; Future  - CRP, inflammation; Future  - ESR: Erythrocyte sedimentation rate; Future  - Anti Nuclear Temitope IgG by IFA with Reflex  - CRP, inflammation  - ESR: Erythrocyte sedimentation rate    Angular cheilitis  Will have Oanh try Vaseline or an emollient such as Eucerin or Aquafor on her lip area. If does not resolve, would recommend coming back without using anything on the area and having a KOH scraping done.      Fatigue, unspecified type  - TSH; Future  - T4, free; Future  - Thyroid peroxidase antibody; Future  - CBC with platelets; Future  - Comprehensive metabolic panel (BMP + Alb, Alk Phos, ALT, AST, Total. Bili, TP); Future  - CRP, inflammation; Future  - ESR: Erythrocyte sedimentation rate; Future  - TSH  - T4, free  - Thyroid peroxidase antibody  - CBC with platelets  - Comprehensive metabolic panel (BMP + Alb, Alk Phos, ALT, AST, Total. Bili, TP)  - CRP, inflammation  - ESR: Erythrocyte sedimentation rate    If labs normal, very low likelihood of an autoimmune disease. See results documentation for recommendations once labs available.      BMI  Estimated body mass index is 25.84 kg/m  as calculated from the following:    Height as of this encounter: 1.753 m (5' 9\").    Weight as of this encounter: 79.4 kg (175 lb).       Subjective   Oanh is a 38 year old, presenting for the following health issues:  Abnormal Bleeding Problem (Pt mentions she has been able to predict her periods, and now very irregular, with cramping ) and Rash (Pt pt rash on both " of her armpits , and a dry skin issue on right side of lip)        3/8/2024     7:49 AM   Additional Questions   Roomed by Karyn   Accompanied by Self         3/8/2024     7:49 AM   Patient Reported Additional Medications   Patient reports taking the following new medications n/a     Oanh is a pleasant 38 year old female who presents to clinic for evaluation of irregular menses and a rash in her axilla.     Cycle: She has always had a regular cycle every 26 days. The past few months, her period has come between 15-31 days. She is tired all the time. She is not sleeping well. Her nails are dry and brittle. She suffers from constipation occasionally. She says she has never had great BM's. She has been hungry lately and struggles to keep weight off.    Rash:  Waxes and wanes (not there today) since the end of December or early January. It is located in her axilla and on her eyelids as well as on the sides of her mouth. When present, it itches. Stress makes it worse.    Family hx: mom had skin cancer. Dad has hyperlipidemia. General fam hx CHF and stroke. Maternal GF had colon cancer. Maternal GM had dementia. Both sisters and her mom have thyroid problems (one had a goiter and the other had thyroidectomy. Mom and both sisters also have anxiety.     aOnh is a mom of three young children. She works fulltime and she is going to school at night.    History of Present Illness       Reason for visit:  Issues with cycle and persistent rash  Symptom onset:  More than a month  Symptom intensity:  Moderate  Symptom progression:  Staying the same  Had these symptoms before:  No  What makes it worse:  Stress  What makes it better:  Rest    She eats 2-3 servings of fruits and vegetables daily.She consumes 0 sweetened beverage(s) daily.She exercises with enough effort to increase her heart rate 9 or less minutes per day.  She exercises with enough effort to increase her heart rate 3 or less days per week. She is missing 1 dose(s)  "of medications per week.       ROS:  no fevers, chills, night sweats, cardiac symptoms, NV.      Objective    BP 98/62   Pulse 83   Temp 98  F (36.7  C) (Tympanic)   Resp 16   Ht 1.753 m (5' 9\")   Wt 79.4 kg (175 lb)   LMP 02/14/2024 (Exact Date)   SpO2 98%   BMI 25.84 kg/m    Body mass index is 25.84 kg/m .  Physical Exam  Vitals reviewed.   Constitutional:       General: She is not in acute distress.     Appearance: Normal appearance. She is normal weight. She is not ill-appearing.   Neck:      Thyroid: No thyroid mass, thyromegaly or thyroid tenderness.   Cardiovascular:      Rate and Rhythm: Normal rate and regular rhythm.      Heart sounds: Normal heart sounds, S1 normal and S2 normal. No murmur heard.  Pulmonary:      Effort: Pulmonary effort is normal.      Breath sounds: Normal breath sounds.   Musculoskeletal:      Right lower leg: No edema.      Left lower leg: No edema.   Lymphadenopathy:      Cervical: No cervical adenopathy.      Right cervical: No superficial cervical adenopathy.     Left cervical: No superficial cervical adenopathy.   Skin:     General: Skin is warm and dry.      Comments: No rash in axilla today. There is an irregularly shaped eczematous rash on both eyelids and there is redness/irritation at the corners of her mouth. Mucous membranes moist.    Neurological:      Mental Status: She is alert.          Results for orders placed or performed in visit on 03/08/24   TSH     Status: Normal   Result Value Ref Range    TSH 2.45 0.30 - 4.20 uIU/mL   T4, free     Status: Normal   Result Value Ref Range    Free T4 1.19 0.90 - 1.70 ng/dL   CBC with platelets     Status: Normal   Result Value Ref Range    WBC Count 5.4 4.0 - 11.0 10e3/uL    RBC Count 4.60 3.80 - 5.20 10e6/uL    Hemoglobin 14.5 11.7 - 15.7 g/dL    Hematocrit 43.9 35.0 - 47.0 %    MCV 95 78 - 100 fL    MCH 31.5 26.5 - 33.0 pg    MCHC 33.0 31.5 - 36.5 g/dL    RDW 12.6 10.0 - 15.0 %    Platelet Count 292 150 - 450 10e3/uL "   Follicle stimulating hormone     Status: None   Result Value Ref Range    FSH 4.4 mIU/mL   Luteinizing Hormone     Status: None   Result Value Ref Range    Luteinizing Hormone 7.0 mIU/mL   Estradiol     Status: None   Result Value Ref Range    Estradiol 123 pg/mL   Prolactin     Status: Normal   Result Value Ref Range    Prolactin 8 5 - 23 ng/mL   Comprehensive metabolic panel (BMP + Alb, Alk Phos, ALT, AST, Total. Bili, TP)     Status: Abnormal   Result Value Ref Range    Sodium 138 135 - 145 mmol/L    Potassium 4.2 3.4 - 5.3 mmol/L    Carbon Dioxide (CO2) 27 22 - 29 mmol/L    Anion Gap 8 7 - 15 mmol/L    Urea Nitrogen 13.7 6.0 - 20.0 mg/dL    Creatinine 0.96 (H) 0.51 - 0.95 mg/dL    GFR Estimate 77 >60 mL/min/1.73m2    Calcium 9.2 8.6 - 10.0 mg/dL    Chloride 103 98 - 107 mmol/L    Glucose 93 70 - 99 mg/dL    Alkaline Phosphatase 51 40 - 150 U/L    AST 20 0 - 45 U/L    ALT 11 0 - 50 U/L    Protein Total 7.0 6.4 - 8.3 g/dL    Albumin 4.3 3.5 - 5.2 g/dL    Bilirubin Total 0.7 <=1.2 mg/dL   CRP, inflammation     Status: Normal   Result Value Ref Range    CRP Inflammation <3.00 <5.00 mg/L   ESR: Erythrocyte sedimentation rate     Status: Normal   Result Value Ref Range    Erythrocyte Sedimentation Rate 10 0 - 20 mm/hr       Signed Electronically by: JOSIAH FORAMN PA-C

## 2024-03-09 DIAGNOSIS — F32.5 MAJOR DEPRESSIVE DISORDER WITH SINGLE EPISODE, IN FULL REMISSION (H): ICD-10-CM

## 2024-03-11 ENCOUNTER — MYC REFILL (OUTPATIENT)
Dept: FAMILY MEDICINE | Facility: CLINIC | Age: 39
End: 2024-03-11
Payer: COMMERCIAL

## 2024-03-11 DIAGNOSIS — F32.5 MAJOR DEPRESSIVE DISORDER WITH SINGLE EPISODE, IN FULL REMISSION (H): ICD-10-CM

## 2024-03-11 LAB
ANA PAT SER IF-IMP: ABNORMAL
ANA SER QL IF: ABNORMAL
ANA TITR SER IF: ABNORMAL {TITER}
THYROPEROXIDASE AB SERPL-ACNC: 28 IU/ML

## 2024-03-12 LAB — TESTOST SERPL-MCNC: 24 NG/DL (ref 8–60)

## 2024-03-19 ENCOUNTER — MYC MEDICAL ADVICE (OUTPATIENT)
Dept: FAMILY MEDICINE | Facility: CLINIC | Age: 39
End: 2024-03-19
Payer: COMMERCIAL

## 2024-03-19 DIAGNOSIS — R76.8 POSITIVE ANA (ANTINUCLEAR ANTIBODY): Primary | ICD-10-CM

## 2024-03-25 ENCOUNTER — LAB (OUTPATIENT)
Dept: LAB | Facility: CLINIC | Age: 39
End: 2024-03-25
Payer: COMMERCIAL

## 2024-03-25 DIAGNOSIS — R76.8 POSITIVE ANA (ANTINUCLEAR ANTIBODY): ICD-10-CM

## 2024-03-25 PROCEDURE — 86235 NUCLEAR ANTIGEN ANTIBODY: CPT

## 2024-03-25 PROCEDURE — 36415 COLL VENOUS BLD VENIPUNCTURE: CPT

## 2024-03-26 LAB
ENA SM IGG SER IA-ACNC: 0.9 U/ML
ENA SM IGG SER IA-ACNC: NEGATIVE
ENA SS-A AB SER IA-ACNC: <0.5 U/ML
ENA SS-A AB SER IA-ACNC: NEGATIVE
ENA SS-B IGG SER IA-ACNC: <0.6 U/ML
ENA SS-B IGG SER IA-ACNC: NEGATIVE
U1 SNRNP IGG SER IA-ACNC: 2 U/ML
U1 SNRNP IGG SER IA-ACNC: NEGATIVE

## 2024-04-19 ENCOUNTER — ANCILLARY PROCEDURE (OUTPATIENT)
Dept: GENERAL RADIOLOGY | Facility: CLINIC | Age: 39
End: 2024-04-19
Attending: PHYSICIAN ASSISTANT
Payer: COMMERCIAL

## 2024-04-19 ENCOUNTER — HOSPITAL ENCOUNTER (EMERGENCY)
Facility: HOSPITAL | Age: 39
Discharge: HOME | End: 2024-04-19
Attending: EMERGENCY MEDICINE | Admitting: EMERGENCY MEDICINE
Payer: COMMERCIAL

## 2024-04-19 ENCOUNTER — OFFICE VISIT (OUTPATIENT)
Dept: FAMILY MEDICINE | Facility: CLINIC | Age: 39
End: 2024-04-19
Payer: COMMERCIAL

## 2024-04-19 VITALS
OXYGEN SATURATION: 99 % | TEMPERATURE: 98.6 F | WEIGHT: 178 LBS | DIASTOLIC BLOOD PRESSURE: 68 MMHG | BODY MASS INDEX: 26.36 KG/M2 | HEIGHT: 69 IN | RESPIRATION RATE: 14 BRPM | SYSTOLIC BLOOD PRESSURE: 108 MMHG | HEART RATE: 83 BPM

## 2024-04-19 VITALS
OXYGEN SATURATION: 96 % | DIASTOLIC BLOOD PRESSURE: 57 MMHG | HEART RATE: 61 BPM | RESPIRATION RATE: 16 BRPM | TEMPERATURE: 98.4 F | SYSTOLIC BLOOD PRESSURE: 102 MMHG | WEIGHT: 175 LBS | HEIGHT: 69 IN | BODY MASS INDEX: 25.92 KG/M2

## 2024-04-19 DIAGNOSIS — R10.84 ABDOMINAL PAIN, GENERALIZED: Primary | ICD-10-CM

## 2024-04-19 DIAGNOSIS — K21.9 GASTROESOPHAGEAL REFLUX DISEASE WITHOUT ESOPHAGITIS: ICD-10-CM

## 2024-04-19 DIAGNOSIS — R10.13 EPIGASTRIC PAIN: ICD-10-CM

## 2024-04-19 DIAGNOSIS — R10.84 ABDOMINAL PAIN, GENERALIZED: ICD-10-CM

## 2024-04-19 LAB
ALBUMIN SERPL BCG-MCNC: 4.1 G/DL (ref 3.5–5.2)
ALBUMIN UR-MCNC: NEGATIVE MG/DL
ALBUMIN UR-MCNC: NEGATIVE MG/DL
ALP SERPL-CCNC: 52 U/L (ref 40–150)
ALT SERPL W P-5'-P-CCNC: 14 U/L (ref 0–50)
ANION GAP SERPL CALCULATED.3IONS-SCNC: 9 MMOL/L (ref 7–15)
APPEARANCE UR: CLEAR
APPEARANCE UR: CLEAR
AST SERPL W P-5'-P-CCNC: 24 U/L (ref 0–45)
BACTERIA #/AREA URNS HPF: ABNORMAL /HPF
BASOPHILS # BLD AUTO: 0 10E3/UL (ref 0–0.2)
BASOPHILS NFR BLD AUTO: 1 %
BILIRUB SERPL-MCNC: 0.5 MG/DL
BILIRUB UR QL STRIP: NEGATIVE
BILIRUB UR QL STRIP: NEGATIVE
BUN SERPL-MCNC: 8.1 MG/DL (ref 6–20)
CALCIUM SERPL-MCNC: 9.4 MG/DL (ref 8.6–10)
CHLORIDE SERPL-SCNC: 100 MMOL/L (ref 98–107)
COLOR UR AUTO: ABNORMAL
COLOR UR AUTO: YELLOW
CREAT SERPL-MCNC: 0.83 MG/DL (ref 0.51–0.95)
CRP SERPL-MCNC: <3 MG/L
DEPRECATED HCO3 PLAS-SCNC: 27 MMOL/L (ref 22–29)
EGFRCR SERPLBLD CKD-EPI 2021: >90 ML/MIN/1.73M2
EOSINOPHIL # BLD AUTO: 0.1 10E3/UL (ref 0–0.7)
EOSINOPHIL NFR BLD AUTO: 1 %
ERYTHROCYTE [DISTWIDTH] IN BLOOD BY AUTOMATED COUNT: 11.9 % (ref 10–15)
GLUCOSE SERPL-MCNC: 110 MG/DL (ref 70–99)
GLUCOSE UR STRIP-MCNC: NEGATIVE MG/DL
GLUCOSE UR STRIP-MCNC: NEGATIVE MG/DL
HCG SERPL QL: NEGATIVE
HCG UR QL: NEGATIVE
HCT VFR BLD AUTO: 43.2 % (ref 35–47)
HGB BLD-MCNC: 14.5 G/DL (ref 11.7–15.7)
HGB UR QL STRIP: NEGATIVE
HGB UR QL STRIP: NEGATIVE
HOLD SPECIMEN: NORMAL
IMM GRANULOCYTES # BLD: 0 10E3/UL
IMM GRANULOCYTES NFR BLD: 0 %
KETONES UR STRIP-MCNC: NEGATIVE MG/DL
KETONES UR STRIP-MCNC: NEGATIVE MG/DL
LEUKOCYTE ESTERASE UR QL STRIP: NEGATIVE
LEUKOCYTE ESTERASE UR QL STRIP: NEGATIVE
LIPASE SERPL-CCNC: 29 U/L (ref 13–60)
LYMPHOCYTES # BLD AUTO: 2.2 10E3/UL (ref 0.8–5.3)
LYMPHOCYTES NFR BLD AUTO: 31 %
MCH RBC QN AUTO: 32.2 PG (ref 26.5–33)
MCHC RBC AUTO-ENTMCNC: 33.6 G/DL (ref 31.5–36.5)
MCV RBC AUTO: 96 FL (ref 78–100)
MONOCYTES # BLD AUTO: 0.4 10E3/UL (ref 0–1.3)
MONOCYTES NFR BLD AUTO: 6 %
MUCOUS THREADS #/AREA URNS LPF: PRESENT /LPF
NEUTROPHILS # BLD AUTO: 4.3 10E3/UL (ref 1.6–8.3)
NEUTROPHILS NFR BLD AUTO: 62 %
NITRATE UR QL: NEGATIVE
NITRATE UR QL: NEGATIVE
PH UR STRIP: 5.5 [PH] (ref 5–7)
PH UR STRIP: 7 [PH] (ref 5–7)
PLATELET # BLD AUTO: 313 10E3/UL (ref 150–450)
POTASSIUM SERPL-SCNC: 4.3 MMOL/L (ref 3.4–5.3)
PROT SERPL-MCNC: 7.2 G/DL (ref 6.4–8.3)
RBC # BLD AUTO: 4.5 10E6/UL (ref 3.8–5.2)
RBC URINE: <1 /HPF
SODIUM SERPL-SCNC: 136 MMOL/L (ref 135–145)
SP GR UR STRIP: 1.01 (ref 1–1.03)
SP GR UR STRIP: 1.01 (ref 1–1.03)
SQUAMOUS EPITHELIAL: 1 /HPF
UROBILINOGEN UR STRIP-ACNC: 0.2 E.U./DL
UROBILINOGEN UR STRIP-MCNC: <2 MG/DL
WBC # BLD AUTO: 7 10E3/UL (ref 4–11)
WBC URINE: 1 /HPF

## 2024-04-19 PROCEDURE — 86140 C-REACTIVE PROTEIN: CPT | Performed by: EMERGENCY MEDICINE

## 2024-04-19 PROCEDURE — 84460 ALANINE AMINO (ALT) (SGPT): CPT | Performed by: PHYSICIAN ASSISTANT

## 2024-04-19 PROCEDURE — 82247 BILIRUBIN TOTAL: CPT | Performed by: PHYSICIAN ASSISTANT

## 2024-04-19 PROCEDURE — 84703 CHORIONIC GONADOTROPIN ASSAY: CPT | Performed by: EMERGENCY MEDICINE

## 2024-04-19 PROCEDURE — 84075 ASSAY ALKALINE PHOSPHATASE: CPT | Performed by: PHYSICIAN ASSISTANT

## 2024-04-19 PROCEDURE — 250N000011 HC RX IP 250 OP 636: Performed by: EMERGENCY MEDICINE

## 2024-04-19 PROCEDURE — 84450 TRANSFERASE (AST) (SGOT): CPT | Performed by: PHYSICIAN ASSISTANT

## 2024-04-19 PROCEDURE — 85025 COMPLETE CBC W/AUTO DIFF WBC: CPT | Performed by: PHYSICIAN ASSISTANT

## 2024-04-19 PROCEDURE — 99214 OFFICE O/P EST MOD 30 MIN: CPT | Performed by: PHYSICIAN ASSISTANT

## 2024-04-19 PROCEDURE — 81003 URINALYSIS AUTO W/O SCOPE: CPT | Mod: XU | Performed by: EMERGENCY MEDICINE

## 2024-04-19 PROCEDURE — 81025 URINE PREGNANCY TEST: CPT | Performed by: EMERGENCY MEDICINE

## 2024-04-19 PROCEDURE — 84155 ASSAY OF PROTEIN SERUM: CPT | Performed by: PHYSICIAN ASSISTANT

## 2024-04-19 PROCEDURE — 36415 COLL VENOUS BLD VENIPUNCTURE: CPT | Performed by: PHYSICIAN ASSISTANT

## 2024-04-19 PROCEDURE — 99283 EMERGENCY DEPT VISIT LOW MDM: CPT

## 2024-04-19 PROCEDURE — 36415 COLL VENOUS BLD VENIPUNCTURE: CPT | Performed by: EMERGENCY MEDICINE

## 2024-04-19 PROCEDURE — 83690 ASSAY OF LIPASE: CPT | Performed by: PHYSICIAN ASSISTANT

## 2024-04-19 PROCEDURE — 82247 BILIRUBIN TOTAL: CPT | Performed by: EMERGENCY MEDICINE

## 2024-04-19 PROCEDURE — 250N000013 HC RX MED GY IP 250 OP 250 PS 637: Performed by: EMERGENCY MEDICINE

## 2024-04-19 PROCEDURE — 74019 RADEX ABDOMEN 2 VIEWS: CPT | Mod: TC | Performed by: RADIOLOGY

## 2024-04-19 PROCEDURE — 83690 ASSAY OF LIPASE: CPT | Performed by: EMERGENCY MEDICINE

## 2024-04-19 PROCEDURE — 80048 BASIC METABOLIC PNL TOTAL CA: CPT | Performed by: PHYSICIAN ASSISTANT

## 2024-04-19 RX ORDER — ONDANSETRON 4 MG/1
4 TABLET, ORALLY DISINTEGRATING ORAL ONCE
Status: COMPLETED | OUTPATIENT
Start: 2024-04-19 | End: 2024-04-19

## 2024-04-19 RX ORDER — FAMOTIDINE 10 MG
10 TABLET ORAL 2 TIMES DAILY
Status: DISCONTINUED | OUTPATIENT
Start: 2024-04-19 | End: 2024-04-20 | Stop reason: HOSPADM

## 2024-04-19 RX ORDER — OMEPRAZOLE 40 MG/1
40 CAPSULE, DELAYED RELEASE ORAL DAILY
Qty: 30 CAPSULE | Refills: 0 | Status: SHIPPED | OUTPATIENT
Start: 2024-04-19 | End: 2024-05-19

## 2024-04-19 RX ORDER — DIPHENHYDRAMINE HCL 25 MG
25 CAPSULE ORAL ONCE
Status: COMPLETED | OUTPATIENT
Start: 2024-04-19 | End: 2024-04-19

## 2024-04-19 RX ADMIN — DIPHENHYDRAMINE HYDROCHLORIDE 25 MG: 25 CAPSULE ORAL at 21:31

## 2024-04-19 RX ADMIN — ONDANSETRON 4 MG: 4 TABLET, ORALLY DISINTEGRATING ORAL at 21:20

## 2024-04-19 RX ADMIN — FAMOTIDINE 10 MG: 10 TABLET ORAL at 22:20

## 2024-04-19 ASSESSMENT — COLUMBIA-SUICIDE SEVERITY RATING SCALE - C-SSRS
6. HAVE YOU EVER DONE ANYTHING, STARTED TO DO ANYTHING, OR PREPARED TO DO ANYTHING TO END YOUR LIFE?: NO
1. IN THE PAST MONTH, HAVE YOU WISHED YOU WERE DEAD OR WISHED YOU COULD GO TO SLEEP AND NOT WAKE UP?: NO
2. HAVE YOU ACTUALLY HAD ANY THOUGHTS OF KILLING YOURSELF IN THE PAST MONTH?: NO

## 2024-04-19 ASSESSMENT — ACTIVITIES OF DAILY LIVING (ADL)
ADLS_ACUITY_SCORE: 35

## 2024-04-19 ASSESSMENT — PATIENT HEALTH QUESTIONNAIRE - PHQ9
10. IF YOU CHECKED OFF ANY PROBLEMS, HOW DIFFICULT HAVE THESE PROBLEMS MADE IT FOR YOU TO DO YOUR WORK, TAKE CARE OF THINGS AT HOME, OR GET ALONG WITH OTHER PEOPLE: SOMEWHAT DIFFICULT
SUM OF ALL RESPONSES TO PHQ QUESTIONS 1-9: 3
SUM OF ALL RESPONSES TO PHQ QUESTIONS 1-9: 3

## 2024-04-19 ASSESSMENT — PAIN SCALES - GENERAL: PAINLEVEL: SEVERE PAIN (7)

## 2024-04-19 NOTE — LETTER
My Depression Action Plan  Name: Oanh Interiano   Date of Birth 1985  Date: 4/19/2024    My doctor: Elif Beard   My clinic: [facility]  [id number] [facility] [address] [phone]            GREEN    ZONE   Good Control    What it looks like:   Things are going generally well. You have normal ups and downs. You may even feel depressed from time to time, but bad moods usually last less than a day.   What you need to do:  Continue to care for yourself (see self care plan)  Check your depression survival kit and update it as needed  Follow your physician s recommendations including any medication.  Do not stop taking medication unless you consult with your physician first.             YELLOW         ZONE Getting Worse    What it looks like:   Depression is starting to interfere with your life.   It may be hard to get out of bed; you may be starting to isolate yourself from others.  Symptoms of depression are starting to last most all day and this has happened for several days.   You may have suicidal thoughts but they are not constant.   What you need to do:     Call your care team. Your response to treatment will improve if you keep your care team informed of your progress. Yellow periods are signs an adjustment may need to be made.     Continue your self-care.  Just get dressed and ready for the day.  Don't give yourself time to talk yourself out of it.    Talk to someone in your support network.    Open up your Depression Self-Care Plan/Wellness Kit.             RED    ZONE Medical Alert - Get Help    What it looks like:   Depression is seriously interfering with your life.   You may experience these or other symptoms: You can t get out of bed most days, can t work or engage in other necessary activities, you have trouble taking care of basic hygiene, or basic responsibilities, thoughts of suicide or death that will not go away, self-injurious behavior.      What you need to do:  Call your care team and request a same-day appointment. If they are not available (weekends or after hours) call your local crisis line, emergency room or 911.          Depression Self-Care Plan / Wellness Kit    Many people find that medication and therapy are helpful treatments for managing depression. In addition, making small changes to your everyday life can help to boost your mood and improve your wellbeing. Below are some tips for you to consider. Be sure to talk with your medical provider and/or behavioral health consultant if your symptoms are worsening or not improving.     Sleep   Sleep hygiene  means all of the habits that support good, restful sleep. It includes maintaining a consistent bedtime and wake time, using your bedroom only for sleeping or sex, and keeping the bedroom dark and free of distractions like a computer, smartphone, or television.     Develop a Healthy Routine  Maintain good hygiene. Get out of bed in the morning, make your bed, brush your teeth, take a shower, and get dressed. Don t spend too much time viewing media that makes you feel stressed. Find time to relax each day.    Exercise  Get some form of exercise every day. This will help reduce pain and release endorphins, the  feel good  chemicals in your brain. It can be as simple as just going for a walk or doing some gardening, anything that will get you moving.      Diet  Strive to eat healthy foods, including fruits and vegetables. Drink plenty of water. Avoid excessive sugar, caffeine, alcohol, and other mood-altering substances.     Stay Connected with Others  Stay in touch with friends and family members.    Manage Your Mood  Try deep breathing, massage therapy, biofeedback, or meditation. Take part in fun activities when you can. Try to find something to smile about each day.     Psychotherapy  Be open to working with a therapist if your provider recommends it.     Medication  Be sure to take your  medication as prescribed. Most anti-depressants need to be taken every day. It usually takes several weeks for medications to work. Not all medicines work for all people. It is important to follow-up with your provider to make sure you have a treatment plan that is working for you. Do not stop your medication abruptly without first discussing it with your provider.    Crisis Resources   These hotlines are for both adults and children. They and are open 24 hours a day, 7 days a week unless noted otherwise.    National Suicide Prevention Lifeline   988 or 4-642-273-QZZA (1446)    Crisis Text Line    www.crisistextline.org  Text HOME to 483816 from anywhere in the [place], anytime, about any type of crisis. A live, trained crisis counselor will receive the text and respond quickly.    [facility] Lifeline for LGBTQ Youth  A national crisis intervention and suicide lifeline for LGBTQ youth under 25. Provides a safe place to talk without judgement. Call 1-773.206.4469; text START to 055479 or visit www.thesimfyvorproject.org to talk to a trained counselor.    For [facility] crisis numbers, visit the [facility] website at:  https://[place].gov/dhs/people-we-serve/adults/health-care/mental-health/resources/crisis-contacts.jsp

## 2024-04-19 NOTE — PROGRESS NOTES
"  Assessment & Plan     (R10.89) Abdominal pain, generalized  (primary encounter diagnosis)  Comment: Patient presents for evaluation of abdominal pain.  Consider retrorectus diagnosis including UTI, diverticulitis, gastritis, norovirus, renal stone, amongst others.  Patient with diffuse epigastric abdominal pain and left-sided abdominal pain that was exacerbated following consumption of tomato based products.  High suspicion it is gastritis.  Discussed with patient possibility of constipation.  She has had some loose stools at times.  No blood in the stool.  Will trial omeprazole once daily for the next month to see if it improves symptoms.  Discussed x-ray of the abdomen to assess for stool burden.  Metabolic panel in process.  Urinalysis to assess for UTI.  Urinalysis without evidence of infection.  White blood cell count is not elevated.  If persistent symptoms could obtain CT scan.  Future order placed.  No evidence of surgical abdomen at this time.  Discussed warning signs to return for.  Patient is afebrile.  Plan: UA Macroscopic with reflex to Microscopic and         Culture - Lab Collect, Enteric Bacteria and         Virus Panel by [facility] Stool, Comprehensive         metabolic panel (BMP + Alb, Alk Phos, ALT, AST,        Total. Bili, TP), CBC with platelets and         differential, Lipase, XR Abdomen 2 Views,         omeprazole (PRILOSEC) 40 MG DR capsule, CT         Abdomen Pelvis w Contrast                BMI  Estimated body mass index is 26.29 kg/m  as calculated from the following:    Height as of this encounter: 1.753 m (5' 9\").    Weight as of this encounter: 80.7 kg (178 lb).       Karlene Hugo is a 38 year old, presenting for the following health issues:  Abdominal Pain        4/19/2024     9:39 AM   Additional Questions   Roomed by Shiela Kuo MA   Accompanied by Self     Answers submitted by the patient for this visit:  Patient Health Questionnaire (Submitted on 4/19/2024)  If you checked off " "any problems, how difficult have these problems made it for you to do your work, take care of things at home, or get along with other people?: Somewhat difficult  PHQ9 TOTAL SCORE: 3    History of Present Illness       Reason for visit:  Severe stomach pain  Symptom onset:  1-3 days ago  Symptoms include:  Severe stomach pain  Symptom intensity:  Severe  Symptom progression:  Staying the same  Had these symptoms before:  No  What makes it worse:  Eating  What makes it better:  No    She eats 0-1 servings of fruits and vegetables daily.She consumes 0 sweetened beverage(s) daily.She exercises with enough effort to increase her heart rate 20 to 29 minutes per day.  She exercises with enough effort to increase her heart rate 3 or less days per week. She is missing 1 dose(s) of medications per week.     No history of acid reflux.  The patient over the weekend did consume alcohol.  3 days ago while at work had onset of epigastric abdominal pain.  At times discomfort radiates to the back.  Also has some discomfort over the left side of the abdomen.  With this has been having more belching.  Patient also experiencing more loose stools.  No bloody stools.  No vomiting.  Denies any urinary symptoms.  No possibility of pregnancy.  LMP April 6.   with prior vasectomy.  No abnormal vaginal discharge or bleeding.  No ibuprofen use.  Abstain from caffeine over the last 2 days.  Has been having 1 bowel movement daily.  Patient did note that consuming tomato sauce had exacerbated her symptoms.  Has trialed Pepto-Bismol as well as Gaviscon      Review of Systems  Constitutional, HEENT, cardiovascular, pulmonary, gi and gu systems are negative, except as otherwise noted.      Objective    /68   Pulse 83   Temp 98.6  F (37  C) (Tympanic)   Resp 14   Ht 1.753 m (5' 9\")   Wt 80.7 kg (178 lb)   LMP 02/14/2024 (Exact Date)   SpO2 99%   Breastfeeding No   BMI 26.29 kg/m    Body mass index is 26.29 kg/m .  Physical Exam "   GENERAL: alert and no distress  HENT: normal cephalic/atraumatic, nose and mouth without ulcers or lesions, oropharynx clear, and oral mucous membranes moist  RESP: lungs clear to auscultation - no rales, rhonchi or wheezes  CV: regular rate and rhythm, normal S1 S2, no S3 or S4, no murmur, click or rub, no peripheral edema  ABDOMEN: Mild tenderness over the epigastric region as well as the left aspect of the abdomen.  No tenderness over McBurney's point.  Negative Julio's.  No guarding or rebound.  No palpable liver edge or other organomegaly.  MS: no gross musculoskeletal defects noted, no edema    Results for orders placed or performed in visit on 04/19/24   XR Abdomen 2 Views     Status: None (Preliminary result)    Narrative    ABDOMEN TWO VIEWS   4/19/2024 10:27 AM     HISTORY: Abdominal pain, generalized.    COMPARISON: None.      Impression    IMPRESSION: Nonobstructive bowel. No free air. Clear lower lungs.      Results for orders placed or performed in visit on 04/19/24   UA Macroscopic with reflex to Microscopic and Culture - Lab Collect     Status: Normal    Specimen: Urine, Clean Catch   Result Value Ref Range    Color Urine Yellow Colorless, Straw, Light Yellow, Yellow    Appearance Urine Clear Clear    Glucose Urine Negative Negative mg/dL    Bilirubin Urine Negative Negative    Ketones Urine Negative Negative mg/dL    Specific Gravity Urine 1.015 1.003 - 1.035    Blood Urine Negative Negative    pH Urine 7.0 5.0 - 7.0    Protein Albumin Urine Negative Negative mg/dL    Urobilinogen Urine 0.2 0.2, 1.0 E.U./dL    Nitrite Urine Negative Negative    Leukocyte Esterase Urine Negative Negative    Narrative    Microscopic not indicated   CBC with platelets and differential     Status: None   Result Value Ref Range    WBC Count 7.0 4.0 - 11.0 10e3/uL    RBC Count 4.50 3.80 - 5.20 10e6/uL    Hemoglobin 14.5 11.7 - 15.7 g/dL    Hematocrit 43.2 35.0 - 47.0 %    MCV 96 78 - 100 fL    MCH 32.2 26.5 - 33.0 pg     MCHC 33.6 31.5 - 36.5 g/dL    RDW 11.9 10.0 - 15.0 %    Platelet Count 313 150 - 450 10e3/uL    % Neutrophils 62 %    % Lymphocytes 31 %    % Monocytes 6 %    % Eosinophils 1 %    % Basophils 1 %    % Immature Granulocytes 0 %    Absolute Neutrophils 4.3 1.6 - 8.3 10e3/uL    Absolute Lymphocytes 2.2 0.8 - 5.3 10e3/uL    Absolute Monocytes 0.4 0.0 - 1.3 10e3/uL    Absolute Eosinophils 0.1 0.0 - 0.7 10e3/uL    Absolute Basophils 0.0 0.0 - 0.2 10e3/uL    Absolute Immature Granulocytes 0.0 <=0.4 10e3/uL   CBC with platelets and differential     Status: None    Narrative    The following orders were created for panel order CBC with platelets and differential.  Procedure                               Abnormality         Status                     ---------                               -----------         ------                     CBC with platelets and d...[[id number]]                      Final result                 Please view results for these tests on the individual orders.           Signed Electronically by: Missael Lau PA-C

## 2024-04-20 LAB
ALBUMIN SERPL BCG-MCNC: 4.1 G/DL (ref 3.5–5.2)
ALP SERPL-CCNC: 48 U/L (ref 40–150)
ALT SERPL W P-5'-P-CCNC: 13 U/L (ref 0–50)
ANION GAP SERPL CALCULATED.3IONS-SCNC: 9 MMOL/L (ref 7–15)
AST SERPL W P-5'-P-CCNC: 21 U/L (ref 0–45)
BILIRUB SERPL-MCNC: 0.5 MG/DL
BUN SERPL-MCNC: 9.4 MG/DL (ref 6–20)
CALCIUM SERPL-MCNC: 9.1 MG/DL (ref 8.6–10)
CHLORIDE SERPL-SCNC: 103 MMOL/L (ref 98–107)
CREAT SERPL-MCNC: 0.87 MG/DL (ref 0.51–0.95)
DEPRECATED HCO3 PLAS-SCNC: 25 MMOL/L (ref 22–29)
EGFRCR SERPLBLD CKD-EPI 2021: 86 ML/MIN/1.73M2
GLUCOSE SERPL-MCNC: 109 MG/DL (ref 70–99)
LIPASE SERPL-CCNC: 33 U/L (ref 13–60)
POTASSIUM SERPL-SCNC: 4.3 MMOL/L (ref 3.4–5.3)
PROT SERPL-MCNC: 6.6 G/DL (ref 6.4–8.3)
SODIUM SERPL-SCNC: 137 MMOL/L (ref 135–145)

## 2024-04-20 NOTE — ED NOTES
"Attempted PIV start x 1. Patient stated \"can you just take it out\" during attempt. She refused another PIV start at this time until discussing plan with provider.   "

## 2024-04-20 NOTE — ED NOTES
"Pt states abdominal pain started Tuesday, this week it has \"come and go each day\", today (04/19/2024) it is constant/ Pain is located in the middle of abdomen and radiates towards back. Rates pain 9/10. Patient refuses IV placement at this time. MD notified   "

## 2024-04-20 NOTE — ED PROVIDER NOTES
EMERGENCY DEPARTMENT ENCOUNTER      NAME: Oanh Interiano  AGE: 38 year old female  YOB: 1985  MRN: [id number]  EVALUATION DATE & TIME: 4/19/2024  8:40 PM    PCP: Elif Beard    ED PROVIDER: Octavio Etienne M.D.      Chief Complaint   Patient presents with    Abdominal Pain         FINAL IMPRESSION:  1. Epigastric pain    2. Gastroesophageal reflux disease without esophagitis          ED COURSE & MEDICAL DECISION MAKING:    Pertinent Labs & Imaging studies reviewed. (See chart for details)  38 year old female presents to the Emergency Department for evaluation of abdominal pain.  Patient reports a severe burning achiness.  Localizes in the mid abdominal area.  Seen in clinic earlier today and had laboratory evaluation performed.  CBC was unremarkable.  Comprehensive metabolic panel lipase pending.  Patient denies prior similar episode.  Reports onset of symptoms after having spicy food almost 5 days ago.  Symptoms do worsen with recumbency and with eating suggesting potential GERD versus biliary disease.  Reports last menstrual cycle normal in timing duration and flow.  Patient reluctant to have IV access established due to difficulties in the past and discomfort.  Is agreeable for allowing laboratory evaluation performed.  Exam reveals a well-nourished follow-up female moderate distress.  Minimal epigastric and right upper quadrant tenderness.  Marked periumbilical tenderness..  Will perform potential imaging pending laboratory results.  Patient treated symptomatically with oral Pepcid and Benadryl.  Patient appears non toxic with stable vitals signs.  Abdominal x-rays reviewed from evaluation earlier today.  Does appear to have some evidence of constipation.  Patient reports having loose watery stools.    9:07 PM I met with the patient for the initial interview and physical examination. Discussed plan for treatment and workup in the ED.    10:18 PM.  C-reactive protein less than 3.  Lipase  normal at 29.  Comprehensive metabolic panel unremarkable.  Urine without evidence of infection.  Pregnancy test negative.  Will reevaluate for response to interventions and determine need for potential imaging.  She reports feeling moderately improved.  Just receiving Pepcid.  Laboratory results explained at length.  11:17 PM I rechecked the patient and discussed results, discharge, follow up, and reasons to return to the ED. We discussed the plan for discharge and the patient is agreeable. Reviewed supportive cares, symptomatic treatment, outpatient follow up, and reasons to return to the Emergency Department. Patient to be discharged by ED RN.     At the conclusion of the encounter I discussed the results of all of the tests and the disposition. The questions were answered and return precautions provided. The patient or family acknowledged understanding and was agreeable with the care plan.     Medical Decision Making  Obtained supplemental history:Supplemental history obtained?: No  Reviewed external records: External records reviewed?: Outpatient Record: 04/19/2024 [facility] impacted by chronic illness:Mental Health  Care significantly affected by social determinants of health:Access to Medical Care  Did you consider but not order tests?: Work up considered but not performed and documented in chart, if applicable  Did you interpret images independently?: Independent interpretation of ECG and images noted in documentation, when applicable.  Consultation discussion with other provider:Did you involve another provider (consultant, MH, pharmacy, etc.)?: No  Discharge. No recommendations on prescription strength medication(s). See documentation for any additional details.        MEDICATIONS GIVEN IN THE EMERGENCY:  Medications   famotidine (PEPCID) tablet 10 mg (10 mg Oral $Given 4/19/24 2220)   diphenhydrAMINE (BENADRYL) capsule 25 mg (25 mg Oral $Given 4/19/24 2131)   ondansetron  (ZOFRAN ODT) ODT tab 4 mg (4 mg Oral $Given 4/19/24 2120)       NEW PRESCRIPTIONS STARTED AT TODAY'S ER VISIT  New Prescriptions    No medications on file          =================================================================    HPI    Patient information was obtained from: Patient    Use of Intrepreter: N/A       Oanh Interiano is a 38 year old female with a pertient medical history of depressive disorder who presents to the ED for evaluation of abdominal pain.    Per chart review, the patient was seen today at [facility] for evaluation of 3 days of epigastric abdominal pain with associated loose stools and discomfort in the left side of her abdomen. She reported worsening of pain after consumption of tomato-based products. UA without evidence of infection. WBC count not elevated. No evidence of surgical abdomen. Patient prescribed omeprazole with pending labs and x-ray.    The patient reports 3 days of intermittent and worsening mid-abdominal pain that worsens after eating or while laying down. She reports diarrhea and nausea.    Her last menstrual period was on 04/06/2024 (~2 weeks ago) with normal timing, duration, and flow. She states that she ate spicy chicken four days ago, but denies other recent changes in diet. She reports a history of food allergies and subsequent constipation, but otherwise denies a history of similar symptoms.    The patient denies a history of ovarian cyst. No urinary symptoms or vomiting.    REVIEW OF SYSTEMS   Constitutional:  Denies fever, chills  Respiratory:  Denies productive cough or increased work of breathing  Cardiovascular:  Denies chest pain, palpitations  GI:  Denies vomiting, or change in bladder habits. Positive for mid-abdominal pain, nausea, and diarrhea.  Musculoskeletal:  Denies any new muscle/joint swelling  Skin:  Denies rash   Neurologic:  Denies focal weakness  All systems negative except as marked.     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    NO HISTORY OF SURGERY           CURRENT MEDICATIONS:      Current Facility-Administered Medications:     famotidine (PEPCID) tablet 10 mg, 10 mg, Oral, BID, Octavio Etienne MD, 10 mg at 04/19/24 2220    Current Outpatient Medications:     albuterol (PROAIR HFA/PROVENTIL HFA/VENTOLIN HFA) 108 (90 Base) MCG/ACT inhaler, Inhale 2 puffs into the lungs every 4 hours as needed, Disp: , Rfl:     omeprazole (PRILOSEC) 40 MG DR capsule, Take 1 capsule (40 mg) by mouth daily for 30 days, Disp: 30 capsule, Rfl: 0    propranolol (INDERAL) 10 MG tablet, Take 1-4 tablets (10-40 mg) by mouth daily as needed (30-60  minutes before public speaking as needed), Disp: 30 tablet, Rfl: 0    sertraline (ZOLOFT) 50 MG tablet, TAKE 1 TABLET(50 MG) BY MOUTH DAILY, Disp: 90 tablet, Rfl: 3    ALLERGIES:  Allergies   Allergen Reactions    Sulfa Antibiotics Hives and Shortness Of Breath    Milk Protein Nausea and Vomiting, Hives, Itching, Swelling and GI Disturbance     Itching, hives, swelling, GI distrubance    Onion GI Disturbance, Diarrhea and Nausea and Vomiting       FAMILY HISTORY:  Family History   Problem Relation Age of Onset    Thyroid Disease Mother         hypothyroidism    Circulatory Mother     Depression Mother     Neurologic Disorder Mother     Psychotic Disorder Mother     Skin Cancer Mother     Thyroid Disease Sister     Depression Sister     Hypertension Father     Lipids Father     Diabetes Paternal Grandmother     Cerebrovascular Disease Paternal Grandmother     Alcohol/Drug Paternal Grandmother     Cerebrovascular Disease  Paternal Grandfather     Alcohol/Drug Paternal Grandfather     Circulatory Paternal Grandfather     Eye Disorder Paternal Grandfather     Hypertension Paternal Grandfather     Cancer - colorectal Maternal Grandfather     Colon Cancer Maternal Grandfather     Allergies Sister     Abdominal Aortic Aneurysm Sister     Thyroid Disease Sister         thyroidectomy    Depression Sister     Alzheimer Disease Maternal Grandmother     Depression Sister     Anxiety Disorder Sister     Thyroid Disease Sister     Asthma Sister     Thyroid Disease Sister        SOCIAL HISTORY:   Social History     Socioeconomic History    Marital status:     Number of children: 0   Occupational History     Employer: [employer]   Tobacco Use    Smoking status: Never    Smokeless tobacco: Never   Vaping Use    Vaping status: Never Used   Substance and Sexual Activity    Alcohol use: Yes     Comment: occ    Drug use: No    Sexual activity: Yes     Partners: Male     Birth control/protection: Condom, Male Surgical   Other Topics Concern    Parent/sibling w/ CABG, MI or angioplasty before 65F 55M? No     Social Determinants of Health     Stress: No Stress Concern Present (9/28/2020)    [facility] of Occupational Health - Occupational Stress Questionnaire     Feeling of Stress : Not at all   Social Connections: Unknown (3/5/2020)    Social Connection and Isolation Panel [NHANES]     Marital Status:    Interpersonal Safety: Low Risk  (3/8/2024)    Interpersonal Safety     Do you feel physically and emotionally safe where you currently live?: Yes     Within the past 12 months, have you been hit, slapped, kicked or otherwise physically hurt by someone?: No     Within the past 12 months, have you been humiliated or emotionally abused in other ways by your partner or ex-partner?: No       VITALS:  Patient Vitals for the past 24 hrs:   BP Temp Temp src Pulse Resp SpO2 Height Weight   04/19/24 2300 105/68 -- -- 62 -- 99 % -- --  "  04/19/24 2245 103/59 -- -- 61 -- 100 % -- --   04/19/24 2230 108/56 -- -- 57 -- 99 % -- --   04/19/24 2216 111/54 -- -- 59 -- 100 % -- --   04/19/24 2200 115/70 -- -- 59 -- 100 % -- --   04/19/24 2145 112/57 -- -- 64 -- 92 % -- --   04/19/24 2101 112/71 -- -- 68 -- 100 % -- --   04/19/24 2045 112/55 -- -- 61 -- 100 % 1.753 m (5' 9\") 79.4 kg (175 lb)   04/19/24 2022 130/70 98.4  F (36.9  C) Oral 64 16 99 % -- --        PHYSICAL EXAM    Constitutional:  Awake, alert, in moderate distress.  HENT:  Normocephalic, Atraumatic. Bilateral external ears normal. Oropharynx moist. Nose normal. Neck- Normal range of motion with no guarding, No midline cervical tenderness, Supple, No stridor.   Eyes:  PERRL, EOMI with no signs of entrapment, Conjunctiva normal, No discharge.   Respiratory:  Normal breath sounds, No respiratory distress, No wheezing.    Cardiovascular:  Normal heart rate, Normal rhythm, No appreciable rubs or gallops.   GI:  Soft, No distension, No palpable masses. Periumbilical tenderness with minimal right upper quadrant and epigastric tenderness.  Musculoskeletal:  Intact distal pulses, No edema. Good range of motion in all major joints. No tenderness to palpation or major deformities noted.  Integument:  Warm, Dry, No erythema, No rash.   Neurologic:  Alert & oriented, Normal motor function, Normal sensory function, No focal deficits noted.   Psychiatric:  Affect normal, Judgment normal, Mood normal.     LAB:  All pertinent labs reviewed and interpreted.  Results for orders placed or performed during the hospital encounter of 04/19/24   hCG QUALitative Pregnancy (blood)   Result Value Ref Range    hCG Serum Qualitative Negative Negative   Extra Blue Top Tube   Result Value Ref Range    Hold Specimen JIC    Result Value Ref Range    Lipase 29 13 - 60 U/L   Comprehensive metabolic panel   Result Value Ref Range    Sodium 136 135 - 145 mmol/L    Potassium 4.3 3.4 - 5.3 mmol/L    Carbon Dioxide (CO2) 27 22 - 29 " mmol/L    Anion Gap 9 7 - 15 mmol/L    Urea Nitrogen 8.1 6.0 - 20.0 mg/dL    Creatinine 0.83 0.51 - 0.95 mg/dL    GFR Estimate >90 >60 mL/min/1.73m2    Calcium 9.4 8.6 - 10.0 mg/dL    Chloride 100 98 - 107 mmol/L    Glucose 110 (H) 70 - 99 mg/dL    Alkaline Phosphatase 52 40 - 150 U/L    AST 24 0 - 45 U/L    ALT 14 0 - 50 U/L    Protein Total 7.2 6.4 - 8.3 g/dL    Albumin 4.1 3.5 - 5.2 g/dL    Bilirubin Total 0.5 <=1.2 mg/dL   UA with Microscopic reflex to Culture    Specimen: Urine, Midstream   Result Value Ref Range    Color Urine Light Yellow Colorless, Straw, Light Yellow, Yellow    Appearance Urine Clear Clear    Glucose Urine Negative Negative mg/dL    Bilirubin Urine Negative Negative    Ketones Urine Negative Negative mg/dL    Specific Gravity Urine 1.014 1.001 - 1.030    Blood Urine Negative Negative    pH Urine 5.5 5.0 - 7.0    Protein Albumin Urine Negative Negative mg/dL    Urobilinogen Urine <2.0 <2.0 mg/dL    Nitrite Urine Negative Negative    Leukocyte Esterase Urine Negative Negative    Bacteria Urine Moderate (A) None Seen /HPF    Mucus Urine Present (A) None Seen /LPF    RBC Urine <1 <=2 /HPF    WBC Urine 1 <=5 /HPF    Squamous Epithelials Urine 1 <=1 /HPF   HCG qualitative urine   Result Value Ref Range    hCG Urine Qualitative Negative Negative   Result Value Ref Range    CRP Inflammation <3.00 <5.00 mg/L       RADIOLOGY:  Reviewed all pertinent imaging. Please see official radiology report.  No orders to display       EKG:    None  I have independently reviewed and interpreted the EKG(s) documented above.    PROCEDURES:   None       I, Ken Hernandez, am serving as a scribe to document services personally performed by Octavio Etienne MD, based on my observation and the provider's statements to me. I, Octavio Etienne MD attest that Ken Hernandez is acting in a scribe capacity, has observed my performance of the services and has documented them in accordance with my direction.    Octavio Etienne M.D.  Emergency  Medicine  [facility] EMERGENCY DEPARTMENT     Octavio Etienne MD  04/20/24 8746

## 2024-04-20 NOTE — ED TRIAGE NOTES
Pt reports severe medial abdominal pain that started Tuesday and is progressively getting worse. Pt reports going to the doctor today but didn't hear any test results.      Triage Assessment (Adult)       Row Name 04/19/24 2020          Triage Assessment    Airway WDL WDL        Respiratory WDL    Respiratory WDL WDL        Skin Circulation/Temperature WDL    Skin Circulation/Temperature WDL WDL        Cardiac WDL    Cardiac WDL WDL        Peripheral/Neurovascular WDL    Peripheral Neurovascular WDL WDL        Cognitive/Neuro/Behavioral WDL    Cognitive/Neuro/Behavioral WDL WDL

## 2024-09-08 ENCOUNTER — HEALTH MAINTENANCE LETTER (OUTPATIENT)
Age: 39
End: 2024-09-08

## 2025-01-05 ENCOUNTER — HEALTH MAINTENANCE LETTER (OUTPATIENT)
Age: 40
End: 2025-01-05

## 2025-02-24 ENCOUNTER — TELEPHONE (OUTPATIENT)
Dept: FAMILY MEDICINE | Facility: CLINIC | Age: 40
End: 2025-02-24
Payer: COMMERCIAL

## 2025-02-24 NOTE — TELEPHONE ENCOUNTER
Patient Quality Outreach    Patient is due for the following:   Breast Cancer Screening - Mammogram  Depression  -  DAP  Physical Preventive Adult Physical      Topic Date Due    Hepatitis B Vaccine (1 of 3 - 19+ 3-dose series) Never done    COVID-19 Vaccine (3 - 2024-25 season) 09/01/2024       Action(s) Taken:   Schedule a Adult Preventative    Type of outreach:    Sent [email or web address] message.    Questions for provider review:               Freida Collado MA

## 2025-03-20 ENCOUNTER — ANCILLARY PROCEDURE (OUTPATIENT)
Dept: MAMMOGRAPHY | Facility: CLINIC | Age: 40
End: 2025-03-20
Attending: FAMILY MEDICINE
Payer: COMMERCIAL

## 2025-03-20 DIAGNOSIS — Z12.31 VISIT FOR SCREENING MAMMOGRAM: ICD-10-CM

## 2025-05-02 DIAGNOSIS — F32.5 MAJOR DEPRESSIVE DISORDER WITH SINGLE EPISODE, IN FULL REMISSION: ICD-10-CM
